# Patient Record
Sex: FEMALE | Race: WHITE | Employment: FULL TIME | URBAN - METROPOLITAN AREA
[De-identification: names, ages, dates, MRNs, and addresses within clinical notes are randomized per-mention and may not be internally consistent; named-entity substitution may affect disease eponyms.]

---

## 2017-06-09 PROCEDURE — 88175 CYTOPATH C/V AUTO FLUID REDO: CPT | Performed by: OBSTETRICS & GYNECOLOGY

## 2018-06-13 ENCOUNTER — OFFICE VISIT (OUTPATIENT)
Dept: FAMILY MEDICINE CLINIC | Facility: CLINIC | Age: 48
End: 2018-06-13

## 2018-06-13 VITALS
WEIGHT: 174 LBS | DIASTOLIC BLOOD PRESSURE: 70 MMHG | OXYGEN SATURATION: 98 % | RESPIRATION RATE: 20 BRPM | HEIGHT: 67 IN | BODY MASS INDEX: 27.31 KG/M2 | SYSTOLIC BLOOD PRESSURE: 120 MMHG | TEMPERATURE: 99 F | HEART RATE: 74 BPM

## 2018-06-13 DIAGNOSIS — Z30.41 SURVEILLANCE OF PREVIOUSLY PRESCRIBED CONTRACEPTIVE PILL: ICD-10-CM

## 2018-06-13 DIAGNOSIS — Z00.00 ANNUAL PHYSICAL EXAM: ICD-10-CM

## 2018-06-13 DIAGNOSIS — Z12.31 ENCOUNTER FOR SCREENING MAMMOGRAM FOR HIGH-RISK PATIENT: ICD-10-CM

## 2018-06-13 DIAGNOSIS — E03.9 HYPOTHYROIDISM, UNSPECIFIED TYPE: ICD-10-CM

## 2018-06-13 DIAGNOSIS — Z87.42 HISTORY OF ABNORMAL CERVICAL PAP SMEAR: ICD-10-CM

## 2018-06-13 DIAGNOSIS — Z01.419 WELL WOMAN EXAM WITH ROUTINE GYNECOLOGICAL EXAM: Primary | ICD-10-CM

## 2018-06-13 PROCEDURE — 90471 IMMUNIZATION ADMIN: CPT | Performed by: FAMILY MEDICINE

## 2018-06-13 PROCEDURE — 90715 TDAP VACCINE 7 YRS/> IM: CPT | Performed by: FAMILY MEDICINE

## 2018-06-13 PROCEDURE — 88175 CYTOPATH C/V AUTO FLUID REDO: CPT | Performed by: FAMILY MEDICINE

## 2018-06-13 PROCEDURE — 99386 PREV VISIT NEW AGE 40-64: CPT | Performed by: FAMILY MEDICINE

## 2018-06-13 PROCEDURE — 87624 HPV HI-RISK TYP POOLED RSLT: CPT | Performed by: FAMILY MEDICINE

## 2018-06-13 RX ORDER — LEVOTHYROXINE SODIUM 0.1 MG/1
100 TABLET ORAL
Qty: 90 TABLET | Refills: 0 | Status: SHIPPED | OUTPATIENT
Start: 2018-06-13

## 2018-06-13 RX ORDER — ETONOGESTREL AND ETHINYL ESTRADIOL 11.7; 2.7 MG/1; MG/1
INSERT, EXTENDED RELEASE VAGINAL
Qty: 3 EACH | Refills: 3 | Status: SHIPPED | OUTPATIENT
Start: 2018-06-13 | End: 2019-08-29 | Stop reason: ALTCHOICE

## 2018-06-13 NOTE — PROGRESS NOTES
HPI:   Berenice Brannon is a 49year old female who presents for a complete physical exam.       Wt Readings from Last 6 Encounters:  06/13/18 : 174 lb  06/09/17 : 164 lb 12.8 oz  05/02/16 : 165 lb 3.2 oz  04/06/15 : 156 lb 11.2 oz  12/03/13 : 162 lb  0 Diet: watches minimally     REVIEW OF SYSTEMS:   GENERAL: feels well otherwise  SKIN: denies any unusual skin lesions  EYES:denies blurred vision or double vision  HEENT: denies nasal congestion, sinus pain or ST  LUNGS: denies shortness of breath with exe LIPID PANEL; Future  - CBC WITH DIFFERENTIAL WITH PLATELET; Future  - VITAMIN B12; Future  - VITAMIN D, 25-HYDROXY; Future  - COMP METABOLIC PANEL (14); Future    3. History of abnormal cervical Pap smear      4.  Encounter for screening mammogram for high-

## 2018-06-22 ENCOUNTER — TELEPHONE (OUTPATIENT)
Dept: FAMILY MEDICINE CLINIC | Facility: CLINIC | Age: 48
End: 2018-06-22

## 2018-06-22 RX ORDER — LEVOTHYROXINE SODIUM 100 MCG
100 TABLET ORAL
Qty: 90 TABLET | Refills: 0 | Status: SHIPPED | OUTPATIENT
Start: 2018-06-22 | End: 2018-09-21

## 2018-06-22 NOTE — TELEPHONE ENCOUNTER
See previous note pt c/o increased bowel movements,decreased appetite,more sleepy,and c/o feeling bloated. Pt states she has had sx since starting on generic Synthroid and would like to go back on brand synthroid. Please advise. Pt has been on generic x 1 wee

## 2018-06-22 NOTE — TELEPHONE ENCOUNTER
Patient was just put on geneic form of synthroid. She wants to know if she can go back to brand name, She is having side effects with her stomach.

## 2018-07-05 ENCOUNTER — LAB ENCOUNTER (OUTPATIENT)
Dept: LAB | Age: 48
End: 2018-07-05
Attending: FAMILY MEDICINE
Payer: COMMERCIAL

## 2018-07-05 DIAGNOSIS — Z00.00 ANNUAL PHYSICAL EXAM: ICD-10-CM

## 2018-07-05 LAB
25-HYDROXYVITAMIN D (TOTAL): 21.3 NG/ML (ref 30–100)
ALBUMIN SERPL-MCNC: 3.6 G/DL (ref 3.5–4.8)
ALP LIVER SERPL-CCNC: 47 U/L (ref 39–100)
ALT SERPL-CCNC: 28 U/L (ref 14–54)
AST SERPL-CCNC: 16 U/L (ref 15–41)
BASOPHILS # BLD AUTO: 0.02 X10(3) UL (ref 0–0.1)
BASOPHILS NFR BLD AUTO: 0.5 %
BILIRUB SERPL-MCNC: 0.5 MG/DL (ref 0.1–2)
BUN BLD-MCNC: 11 MG/DL (ref 8–20)
CALCIUM BLD-MCNC: 9.2 MG/DL (ref 8.3–10.3)
CHLORIDE: 109 MMOL/L (ref 101–111)
CHOLEST SMN-MCNC: 228 MG/DL (ref ?–200)
CO2: 23 MMOL/L (ref 22–32)
CREAT BLD-MCNC: 0.8 MG/DL (ref 0.55–1.02)
EOSINOPHIL # BLD AUTO: 0.14 X10(3) UL (ref 0–0.3)
EOSINOPHIL NFR BLD AUTO: 3.6 %
ERYTHROCYTE [DISTWIDTH] IN BLOOD BY AUTOMATED COUNT: 12.4 % (ref 11.5–16)
FREE T4: 1.1 NG/DL (ref 0.9–1.8)
GLUCOSE BLD-MCNC: 86 MG/DL (ref 70–99)
HAV AB SERPL IA-ACNC: 268 PG/ML (ref 193–986)
HCT VFR BLD AUTO: 44.3 % (ref 34–50)
HDLC SERPL-MCNC: 84 MG/DL (ref 45–?)
HDLC SERPL: 2.71 {RATIO} (ref ?–4.44)
HGB BLD-MCNC: 14.2 G/DL (ref 12–16)
IMMATURE GRANULOCYTE COUNT: 0.01 X10(3) UL (ref 0–1)
IMMATURE GRANULOCYTE RATIO %: 0.3 %
LDLC SERPL CALC-MCNC: 107 MG/DL (ref ?–130)
LYMPHOCYTES # BLD AUTO: 1.46 X10(3) UL (ref 0.9–4)
LYMPHOCYTES NFR BLD AUTO: 37.2 %
M PROTEIN MFR SERPL ELPH: 7.3 G/DL (ref 6.1–8.3)
MCH RBC QN AUTO: 29.7 PG (ref 27–33.2)
MCHC RBC AUTO-ENTMCNC: 32.1 G/DL (ref 31–37)
MCV RBC AUTO: 92.7 FL (ref 81–100)
MONOCYTES # BLD AUTO: 0.34 X10(3) UL (ref 0.1–1)
MONOCYTES NFR BLD AUTO: 8.7 %
NEUTROPHIL ABS PRELIM: 1.96 X10 (3) UL (ref 1.3–6.7)
NEUTROPHILS # BLD AUTO: 1.96 X10(3) UL (ref 1.3–6.7)
NEUTROPHILS NFR BLD AUTO: 49.7 %
NONHDLC SERPL-MCNC: 144 MG/DL (ref ?–130)
PLATELET # BLD AUTO: 213 10(3)UL (ref 150–450)
POTASSIUM SERPL-SCNC: 4.2 MMOL/L (ref 3.6–5.1)
RBC # BLD AUTO: 4.78 X10(6)UL (ref 3.8–5.1)
RED CELL DISTRIBUTION WIDTH-SD: 42.5 FL (ref 35.1–46.3)
SODIUM SERPL-SCNC: 140 MMOL/L (ref 136–144)
TRIGL SERPL-MCNC: 185 MG/DL (ref ?–150)
TSI SER-ACNC: 3.82 MIU/ML (ref 0.35–5.5)
VLDLC SERPL CALC-MCNC: 37 MG/DL (ref 5–40)
WBC # BLD AUTO: 3.9 X10(3) UL (ref 4–13)

## 2018-07-05 PROCEDURE — 80061 LIPID PANEL: CPT

## 2018-07-05 PROCEDURE — 84439 ASSAY OF FREE THYROXINE: CPT

## 2018-07-05 PROCEDURE — 36415 COLL VENOUS BLD VENIPUNCTURE: CPT

## 2018-07-05 PROCEDURE — 85025 COMPLETE CBC W/AUTO DIFF WBC: CPT

## 2018-07-05 PROCEDURE — 80053 COMPREHEN METABOLIC PANEL: CPT

## 2018-07-05 PROCEDURE — 82306 VITAMIN D 25 HYDROXY: CPT

## 2018-07-05 PROCEDURE — 82607 VITAMIN B-12: CPT

## 2018-07-05 PROCEDURE — 84443 ASSAY THYROID STIM HORMONE: CPT

## 2018-07-11 ENCOUNTER — TELEPHONE (OUTPATIENT)
Dept: FAMILY MEDICINE CLINIC | Facility: CLINIC | Age: 48
End: 2018-07-11

## 2018-07-11 DIAGNOSIS — E55.9 VITAMIN D DEFICIENCY: Primary | ICD-10-CM

## 2018-07-11 DIAGNOSIS — E78.00 ELEVATED CHOLESTEROL: ICD-10-CM

## 2018-07-11 DIAGNOSIS — E56.9 VITAMIN DEFICIENCY: ICD-10-CM

## 2018-07-11 NOTE — TELEPHONE ENCOUNTER
----- Message from Pamella Henry DO sent at 7/10/2018  6:23 PM CDT -----  Your cholesterol is elevated.  Please increase exercise, decrease saturated fat in your diet   Start over the counter B complex for low B12 and vitamin D 3  5,000 IU daily   Repeat labs in 4 months

## 2018-09-21 RX ORDER — LEVOTHYROXINE SODIUM 100 MCG
TABLET ORAL
Qty: 90 TABLET | Refills: 0 | Status: SHIPPED | OUTPATIENT
Start: 2018-09-21 | End: 2019-01-23

## 2019-01-24 RX ORDER — LEVOTHYROXINE SODIUM 100 MCG
100 TABLET ORAL DAILY
Qty: 90 TABLET | Refills: 0 | Status: SHIPPED | OUTPATIENT
Start: 2019-01-24 | End: 2019-04-25

## 2019-01-24 NOTE — TELEPHONE ENCOUNTER
Rx Request  Levothyroxine Sodium (SYNTHROID) 100 MCG Oral Tab    Disp:        90            R: 0      Associated Dx: Hypothyroidism    Last Visit: 06/13/2018    Last Refilled:09/21/2018     Protocol Passed?  Yes[ x ]       No[  ]

## 2019-01-25 ENCOUNTER — APPOINTMENT (OUTPATIENT)
Dept: LAB | Age: 49
End: 2019-01-25
Attending: FAMILY MEDICINE
Payer: COMMERCIAL

## 2019-01-25 ENCOUNTER — HOSPITAL ENCOUNTER (OUTPATIENT)
Dept: MAMMOGRAPHY | Age: 49
Discharge: HOME | End: 2019-01-25
Attending: FAMILY MEDICINE
Payer: COMMERCIAL

## 2019-01-25 DIAGNOSIS — E55.9 VITAMIN D DEFICIENCY: ICD-10-CM

## 2019-01-25 DIAGNOSIS — E78.00 ELEVATED CHOLESTEROL: ICD-10-CM

## 2019-01-25 DIAGNOSIS — E56.9 VITAMIN DEFICIENCY: ICD-10-CM

## 2019-01-25 DIAGNOSIS — Z12.31 ENCOUNTER FOR SCREENING MAMMOGRAM FOR HIGH-RISK PATIENT: ICD-10-CM

## 2019-01-25 LAB
CHOLEST SMN-MCNC: 223 MG/DL (ref ?–200)
HAV AB SERPL IA-ACNC: 496 PG/ML (ref 193–986)
HDLC SERPL-MCNC: 92 MG/DL (ref 40–59)
LDLC SERPL CALC-MCNC: 102 MG/DL (ref ?–100)
NONHDLC SERPL-MCNC: 131 MG/DL (ref ?–130)
TRIGL SERPL-MCNC: 147 MG/DL (ref 30–149)
VIT D+METAB SERPL-MCNC: 34.4 NG/ML (ref 30–100)
VLDLC SERPL CALC-MCNC: 29 MG/DL (ref 0–30)

## 2019-01-25 PROCEDURE — 80061 LIPID PANEL: CPT

## 2019-01-25 PROCEDURE — 36415 COLL VENOUS BLD VENIPUNCTURE: CPT

## 2019-01-25 PROCEDURE — 82306 VITAMIN D 25 HYDROXY: CPT

## 2019-01-25 PROCEDURE — 77063 BREAST TOMOSYNTHESIS BI: CPT | Performed by: FAMILY MEDICINE

## 2019-01-25 PROCEDURE — 77067 SCR MAMMO BI INCL CAD: CPT | Performed by: FAMILY MEDICINE

## 2019-01-25 PROCEDURE — 82607 VITAMIN B-12: CPT

## 2019-01-28 ENCOUNTER — OFFICE VISIT (OUTPATIENT)
Dept: FAMILY MEDICINE CLINIC | Facility: CLINIC | Age: 49
End: 2019-01-28
Payer: COMMERCIAL

## 2019-01-28 VITALS
RESPIRATION RATE: 18 BRPM | TEMPERATURE: 98 F | DIASTOLIC BLOOD PRESSURE: 78 MMHG | HEIGHT: 67 IN | WEIGHT: 174 LBS | HEART RATE: 87 BPM | SYSTOLIC BLOOD PRESSURE: 110 MMHG | OXYGEN SATURATION: 98 % | BODY MASS INDEX: 27.31 KG/M2

## 2019-01-28 DIAGNOSIS — E56.9 VITAMIN DEFICIENCY: ICD-10-CM

## 2019-01-28 DIAGNOSIS — F39 MOOD DISORDER (HCC): ICD-10-CM

## 2019-01-28 DIAGNOSIS — E78.00 ELEVATED CHOLESTEROL: Primary | ICD-10-CM

## 2019-01-28 PROCEDURE — 99213 OFFICE O/P EST LOW 20 MIN: CPT | Performed by: FAMILY MEDICINE

## 2019-01-28 RX ORDER — MELATONIN
1000 DAILY
COMMUNITY

## 2019-04-16 ENCOUNTER — OFFICE VISIT (OUTPATIENT)
Dept: FAMILY MEDICINE CLINIC | Facility: CLINIC | Age: 49
End: 2019-04-16
Payer: COMMERCIAL

## 2019-04-16 VITALS
DIASTOLIC BLOOD PRESSURE: 80 MMHG | SYSTOLIC BLOOD PRESSURE: 116 MMHG | RESPIRATION RATE: 16 BRPM | HEART RATE: 81 BPM | TEMPERATURE: 99 F | HEIGHT: 67 IN | OXYGEN SATURATION: 98 % | BODY MASS INDEX: 27.62 KG/M2 | WEIGHT: 176 LBS

## 2019-04-16 DIAGNOSIS — J32.0 CHRONIC MAXILLARY SINUSITIS: Primary | ICD-10-CM

## 2019-04-16 PROCEDURE — 99213 OFFICE O/P EST LOW 20 MIN: CPT | Performed by: FAMILY MEDICINE

## 2019-04-16 RX ORDER — CEFDINIR 300 MG/1
300 CAPSULE ORAL 2 TIMES DAILY
Qty: 20 CAPSULE | Refills: 0 | Status: SHIPPED | OUTPATIENT
Start: 2019-04-16 | End: 2019-04-26

## 2019-04-16 RX ORDER — FLUTICASONE PROPIONATE 50 MCG
2 SPRAY, SUSPENSION (ML) NASAL NIGHTLY
Qty: 1 BOTTLE | Refills: 0 | Status: SHIPPED | OUTPATIENT
Start: 2019-04-16 | End: 2021-08-03 | Stop reason: ALTCHOICE

## 2019-04-16 NOTE — PROGRESS NOTES
Pt here with cold symptoms.     3rd bout since February   No treatment thus far   Getting worse   OTC meds theraflu   +  cough and congestion  +  sinus tenderness  No ear pain  +  throat pain  No  fever and chills  ?  sick contacts    ROS otherwise negative

## 2019-04-25 RX ORDER — LEVOTHYROXINE SODIUM 100 MCG
TABLET ORAL
Qty: 90 TABLET | Refills: 0 | Status: SHIPPED | OUTPATIENT
Start: 2019-04-25 | End: 2019-07-24

## 2019-07-24 RX ORDER — LEVOTHYROXINE SODIUM 100 MCG
TABLET ORAL
Qty: 90 TABLET | Refills: 0 | Status: SHIPPED | OUTPATIENT
Start: 2019-07-24 | End: 2019-10-25

## 2019-08-29 ENCOUNTER — OFFICE VISIT (OUTPATIENT)
Dept: FAMILY MEDICINE CLINIC | Facility: CLINIC | Age: 49
End: 2019-08-29
Payer: COMMERCIAL

## 2019-08-29 VITALS
OXYGEN SATURATION: 98 % | SYSTOLIC BLOOD PRESSURE: 116 MMHG | TEMPERATURE: 98 F | RESPIRATION RATE: 16 BRPM | HEART RATE: 84 BPM | HEIGHT: 67 IN | BODY MASS INDEX: 27.62 KG/M2 | DIASTOLIC BLOOD PRESSURE: 78 MMHG | WEIGHT: 176 LBS

## 2019-08-29 DIAGNOSIS — Z12.31 ENCOUNTER FOR SCREENING MAMMOGRAM FOR HIGH-RISK PATIENT: ICD-10-CM

## 2019-08-29 DIAGNOSIS — Z01.419 WELL WOMAN EXAM WITH ROUTINE GYNECOLOGICAL EXAM: Primary | ICD-10-CM

## 2019-08-29 DIAGNOSIS — Z12.11 COLON CANCER SCREENING: ICD-10-CM

## 2019-08-29 DIAGNOSIS — Z00.00 ANNUAL PHYSICAL EXAM: ICD-10-CM

## 2019-08-29 PROCEDURE — 88175 CYTOPATH C/V AUTO FLUID REDO: CPT | Performed by: FAMILY MEDICINE

## 2019-08-29 PROCEDURE — 99396 PREV VISIT EST AGE 40-64: CPT | Performed by: FAMILY MEDICINE

## 2019-08-29 PROCEDURE — 87624 HPV HI-RISK TYP POOLED RSLT: CPT | Performed by: FAMILY MEDICINE

## 2019-08-29 NOTE — PROGRESS NOTES
HPI:   Obed Peacock is a 47year old female who presents for a complete physical exam.       Wt Readings from Last 6 Encounters:  08/29/19 : 176 lb  04/16/19 : 176 lb  01/28/19 : 174 lb  06/13/18 : 174 lb  06/09/17 : 164 lb 12.8 oz  05/02/16 : 165 l Cholesterol Mother    • Diabetes Mother    • Heart Disease Paternal Grandfather    • Diabetes Paternal Grandmother    • Heart Disease Paternal Grandmother    • Hypertension Brother       Social History:   Social History    Tobacco Use      Smoking status: PAP was done - some bleeding started after pap done   RECTAL:good rectal tone,no mass, brown stool, stool is OB negative  MUSCULOSKELETAL: back is not tender,FROM of the back  EXTREMITIES: no cyanosis, clubbing or edema  NEURO: cranial nerves are intact,mo

## 2019-08-30 LAB — HPV I/H RISK 1 DNA SPEC QL NAA+PROBE: NEGATIVE

## 2019-09-16 ENCOUNTER — LAB ENCOUNTER (OUTPATIENT)
Dept: LAB | Age: 49
End: 2019-09-16
Attending: FAMILY MEDICINE
Payer: COMMERCIAL

## 2019-09-16 DIAGNOSIS — Z00.00 ANNUAL PHYSICAL EXAM: ICD-10-CM

## 2019-09-16 LAB
ALBUMIN SERPL-MCNC: 3.6 G/DL (ref 3.4–5)
ALBUMIN/GLOB SERPL: 1.1 {RATIO} (ref 1–2)
ALP LIVER SERPL-CCNC: 56 U/L (ref 39–100)
ALT SERPL-CCNC: 25 U/L (ref 13–56)
ANION GAP SERPL CALC-SCNC: 5 MMOL/L (ref 0–18)
AST SERPL-CCNC: 18 U/L (ref 15–37)
BASOPHILS # BLD AUTO: 0.02 X10(3) UL (ref 0–0.2)
BASOPHILS NFR BLD AUTO: 0.5 %
BILIRUB SERPL-MCNC: 0.6 MG/DL (ref 0.1–2)
BUN BLD-MCNC: 10 MG/DL (ref 7–18)
BUN/CREAT SERPL: 12.2 (ref 10–20)
CALCIUM BLD-MCNC: 8.5 MG/DL (ref 8.5–10.1)
CHLORIDE SERPL-SCNC: 109 MMOL/L (ref 98–112)
CHOLEST SMN-MCNC: 200 MG/DL (ref ?–200)
CO2 SERPL-SCNC: 26 MMOL/L (ref 21–32)
CREAT BLD-MCNC: 0.82 MG/DL (ref 0.55–1.02)
DEPRECATED RDW RBC AUTO: 39.3 FL (ref 35.1–46.3)
EOSINOPHIL # BLD AUTO: 0.17 X10(3) UL (ref 0–0.7)
EOSINOPHIL NFR BLD AUTO: 4 %
ERYTHROCYTE [DISTWIDTH] IN BLOOD BY AUTOMATED COUNT: 12 % (ref 11–15)
GLOBULIN PLAS-MCNC: 3.4 G/DL (ref 2.8–4.4)
GLUCOSE BLD-MCNC: 93 MG/DL (ref 70–99)
HCT VFR BLD AUTO: 41.9 % (ref 35–48)
HDLC SERPL-MCNC: 76 MG/DL (ref 40–59)
HGB BLD-MCNC: 14.3 G/DL (ref 12–16)
IMM GRANULOCYTES # BLD AUTO: 0.01 X10(3) UL (ref 0–1)
IMM GRANULOCYTES NFR BLD: 0.2 %
LDLC SERPL CALC-MCNC: 103 MG/DL (ref ?–100)
LYMPHOCYTES # BLD AUTO: 1.41 X10(3) UL (ref 1–4)
LYMPHOCYTES NFR BLD AUTO: 32.8 %
M PROTEIN MFR SERPL ELPH: 7 G/DL (ref 6.4–8.2)
MCH RBC QN AUTO: 30.7 PG (ref 26–34)
MCHC RBC AUTO-ENTMCNC: 34.1 G/DL (ref 31–37)
MCV RBC AUTO: 89.9 FL (ref 80–100)
MONOCYTES # BLD AUTO: 0.34 X10(3) UL (ref 0.1–1)
MONOCYTES NFR BLD AUTO: 7.9 %
NEUTROPHILS # BLD AUTO: 2.35 X10 (3) UL (ref 1.5–7.7)
NEUTROPHILS # BLD AUTO: 2.35 X10(3) UL (ref 1.5–7.7)
NEUTROPHILS NFR BLD AUTO: 54.6 %
NONHDLC SERPL-MCNC: 124 MG/DL (ref ?–130)
OSMOLALITY SERPL CALC.SUM OF ELEC: 289 MOSM/KG (ref 275–295)
PLATELET # BLD AUTO: 208 10(3)UL (ref 150–450)
POTASSIUM SERPL-SCNC: 4.5 MMOL/L (ref 3.5–5.1)
RBC # BLD AUTO: 4.66 X10(6)UL (ref 3.8–5.3)
SODIUM SERPL-SCNC: 140 MMOL/L (ref 136–145)
T4 FREE SERPL-MCNC: 1.2 NG/DL (ref 0.8–1.7)
TRIGL SERPL-MCNC: 107 MG/DL (ref 30–149)
TSI SER-ACNC: 0.92 MIU/ML (ref 0.36–3.74)
VIT B12 SERPL-MCNC: 684 PG/ML (ref 193–986)
VIT D+METAB SERPL-MCNC: 38.1 NG/ML (ref 30–100)
VLDLC SERPL CALC-MCNC: 21 MG/DL (ref 0–30)
WBC # BLD AUTO: 4.3 X10(3) UL (ref 4–11)

## 2019-09-16 PROCEDURE — 84443 ASSAY THYROID STIM HORMONE: CPT

## 2019-09-16 PROCEDURE — 84439 ASSAY OF FREE THYROXINE: CPT

## 2019-09-16 PROCEDURE — 82607 VITAMIN B-12: CPT

## 2019-09-16 PROCEDURE — 80053 COMPREHEN METABOLIC PANEL: CPT

## 2019-09-16 PROCEDURE — 82306 VITAMIN D 25 HYDROXY: CPT

## 2019-09-16 PROCEDURE — 80061 LIPID PANEL: CPT

## 2019-09-16 PROCEDURE — 85025 COMPLETE CBC W/AUTO DIFF WBC: CPT

## 2019-09-16 PROCEDURE — 36415 COLL VENOUS BLD VENIPUNCTURE: CPT

## 2019-10-25 RX ORDER — LEVOTHYROXINE SODIUM 100 MCG
TABLET ORAL
Qty: 90 TABLET | Refills: 0 | Status: SHIPPED | OUTPATIENT
Start: 2019-10-25 | End: 2020-01-27

## 2019-10-25 NOTE — TELEPHONE ENCOUNTER
Rx Request  SYNTHROID 100 MCG Oral Tab    Disp:     90               R: 0    Last Visit: 08/29/2019    Last Refilled: 07/24/2019    Protocol Passed?  Yes[ x ]       No[  ]

## 2019-11-23 ENCOUNTER — E-VISIT (OUTPATIENT)
Dept: FAMILY MEDICINE CLINIC | Facility: CLINIC | Age: 49
End: 2019-11-23

## 2019-11-23 DIAGNOSIS — R39.9 SYMPTOMS OF URINARY TRACT INFECTION: Primary | ICD-10-CM

## 2019-11-23 PROCEDURE — 98969 ONLINE SERVICE BY HC PRO: CPT | Performed by: NURSE PRACTITIONER

## 2019-11-23 RX ORDER — NITROFURANTOIN 25; 75 MG/1; MG/1
CAPSULE ORAL
Qty: 14 CAPSULE | Refills: 0 | Status: SHIPPED | OUTPATIENT
Start: 2019-11-23 | End: 2020-07-20

## 2019-11-23 NOTE — PROGRESS NOTES
Jaron Sidhu is a 47year old female. HPI:   See answers to questions above.      Current Outpatient Medications   Medication Sig Dispense Refill   • Nitrofurantoin Monohyd Macro 100 MG Oral Cap Take one capsule 2 times daily for 7 days 14 capsule 0

## 2020-01-27 ENCOUNTER — HOSPITAL ENCOUNTER (OUTPATIENT)
Dept: MAMMOGRAPHY | Age: 50
Discharge: HOME | End: 2020-01-27
Attending: FAMILY MEDICINE
Payer: COMMERCIAL

## 2020-01-27 DIAGNOSIS — Z12.31 ENCOUNTER FOR SCREENING MAMMOGRAM FOR HIGH-RISK PATIENT: ICD-10-CM

## 2020-01-27 PROCEDURE — 77063 BREAST TOMOSYNTHESIS BI: CPT | Performed by: FAMILY MEDICINE

## 2020-01-27 PROCEDURE — 77067 SCR MAMMO BI INCL CAD: CPT | Performed by: FAMILY MEDICINE

## 2020-01-27 RX ORDER — LEVOTHYROXINE SODIUM 100 MCG
TABLET ORAL
Qty: 90 TABLET | Refills: 0 | Status: SHIPPED | OUTPATIENT
Start: 2020-01-27 | End: 2020-04-22

## 2020-03-01 ENCOUNTER — E-VISIT (OUTPATIENT)
Dept: FAMILY MEDICINE CLINIC | Facility: CLINIC | Age: 50
End: 2020-03-01

## 2020-03-01 DIAGNOSIS — R30.0 DYSURIA: Primary | ICD-10-CM

## 2020-03-01 RX ORDER — NITROFURANTOIN 25; 75 MG/1; MG/1
100 CAPSULE ORAL 2 TIMES DAILY
Qty: 14 CAPSULE | Refills: 0 | Status: SHIPPED | OUTPATIENT
Start: 2020-03-01 | End: 2020-03-08

## 2020-04-22 RX ORDER — LEVOTHYROXINE SODIUM 100 MCG
100 TABLET ORAL DAILY
Qty: 90 TABLET | Refills: 0 | Status: SHIPPED | OUTPATIENT
Start: 2020-04-22 | End: 2020-07-24

## 2020-04-27 ENCOUNTER — PATIENT MESSAGE (OUTPATIENT)
Dept: FAMILY MEDICINE CLINIC | Facility: CLINIC | Age: 50
End: 2020-04-27

## 2020-04-27 NOTE — TELEPHONE ENCOUNTER
From: Suma Sharp  To: Jerome Moe DO  Sent: 4/27/2020 7:59 AM CDT  Subject: Other    Good morning Dr. Vázquez Phi you and your family are all well!! 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     Wh

## 2020-07-20 ENCOUNTER — E-VISIT (OUTPATIENT)
Dept: FAMILY MEDICINE CLINIC | Facility: CLINIC | Age: 50
End: 2020-07-20

## 2020-07-20 DIAGNOSIS — R39.9 UTI SYMPTOMS: Primary | ICD-10-CM

## 2020-07-20 PROCEDURE — 99421 OL DIG E/M SVC 5-10 MIN: CPT | Performed by: NURSE PRACTITIONER

## 2020-07-20 RX ORDER — NITROFURANTOIN 25; 75 MG/1; MG/1
100 CAPSULE ORAL 2 TIMES DAILY
Qty: 14 CAPSULE | Refills: 0 | Status: SHIPPED | OUTPATIENT
Start: 2020-07-20 | End: 2020-07-27

## 2020-07-20 NOTE — PROGRESS NOTES
Joy Ernst is a 47year old female. HPI:   See answers to questions above.      Current Outpatient Medications   Medication Sig Dispense Refill   • Nitrofurantoin Monohyd Macro 100 MG Oral Cap Take 1 capsule (100 mg total) by mouth 2 (two) times d the service:  5 minutes    Patient advised to follow up with PCP if no improvement or worsening of symptoms  Refer to MyChart message for specific patient instructions

## 2020-07-20 NOTE — PATIENT INSTRUCTIONS
Bladder Infection, Female (Adult)     Urine normally doesn't have any germs (bacteria) in it. But bacteria can get into the urinary tract from the skin around the rectum. Or they can travel in the blood from other parts of the body.  Once they are in your · Wiping incorrectly after urinating. Always wipe from front to back. · Bowel incontinence  · Pregnancy  · Procedures such as having a catheter put in  · Older age  · Not emptying your bladder. This can give bacteria a chance to grow in your urine.   · Flu · Improve your diet and prevent constipation. Eat more fresh fruits and vegetables, and fiber. Eat less junk foods and fatty foods. · Don't have sex until your symptoms are gone. · Don't have caffeine, alcohol, and spicy foods.  These can irritate your bl How should I use this medicine? Take this medicine by mouth with a glass of water. Follow the directions on the prescription label. Take this medicine with food or milk. Take your doses at regular intervals.  Do not take your medicine more often than direjoseph What should I tell my health care provider before I take this medicine?   They need to know if you have any of these conditions:  · anemia  · diabetes  · glucose-6-phosphate dehydrogenase deficiency  · kidney disease  · liver disease  · lung disease  · othe

## 2020-07-24 RX ORDER — LEVOTHYROXINE SODIUM 100 MCG
100 TABLET ORAL DAILY
Qty: 90 TABLET | Refills: 0 | Status: SHIPPED | OUTPATIENT
Start: 2020-07-24 | End: 2020-10-24

## 2020-07-24 NOTE — TELEPHONE ENCOUNTER
Last ov 8/29/2019    Last refill 4/22/2020    .   Thyroid:    Lab Results   Component Value Date    TSH 0.919 09/16/2019    TSH 3.820 07/05/2018    T4F 1.2 09/16/2019    T4F 1.1 07/05/2018

## 2020-09-07 NOTE — PROGRESS NOTES
HPI:   Berenice Brannon is a 45year old female who presents for a complete physical exam.       Wt Readings from Last 6 Encounters:  09/08/20 : 172 lb (78 kg)  08/29/19 : 176 lb (79.8 kg)  04/16/19 : 176 lb (79.8 kg)  01/28/19 : 174 lb (78.9 kg)  06/13 2009    cin1   • HYPOTHYROIDISM    • SEASONAL ALLERGIES    • VARICELLA       No past surgical history on file.    Family History   Problem Relation Age of Onset   • Hypertension Father    • Heart Disease Father    • Heart Surgery Father 79        2 stints masses, HSM or tenderness  CHEST: no chest tenderness  BREAST: no axillary LAD, no masses no nipple discharge bilaterally  : external genitalia - no inguinal LAD, no lesions. Speculum exam- introitus is normal,scant discharge,cervix is pink.  Bimanual ex

## 2020-09-08 ENCOUNTER — OFFICE VISIT (OUTPATIENT)
Dept: FAMILY MEDICINE CLINIC | Facility: CLINIC | Age: 50
End: 2020-09-08
Payer: COMMERCIAL

## 2020-09-08 VITALS
TEMPERATURE: 98 F | RESPIRATION RATE: 18 BRPM | BODY MASS INDEX: 27 KG/M2 | SYSTOLIC BLOOD PRESSURE: 122 MMHG | HEIGHT: 67 IN | DIASTOLIC BLOOD PRESSURE: 72 MMHG | OXYGEN SATURATION: 98 % | WEIGHT: 172 LBS | HEART RATE: 70 BPM

## 2020-09-08 DIAGNOSIS — Z12.31 ENCOUNTER FOR SCREENING MAMMOGRAM FOR HIGH-RISK PATIENT: ICD-10-CM

## 2020-09-08 DIAGNOSIS — Z01.419 WELL WOMAN EXAM WITH ROUTINE GYNECOLOGICAL EXAM: Primary | ICD-10-CM

## 2020-09-08 DIAGNOSIS — Z00.00 ANNUAL PHYSICAL EXAM: ICD-10-CM

## 2020-09-08 PROCEDURE — 88175 CYTOPATH C/V AUTO FLUID REDO: CPT | Performed by: FAMILY MEDICINE

## 2020-09-08 PROCEDURE — 3008F BODY MASS INDEX DOCD: CPT | Performed by: FAMILY MEDICINE

## 2020-09-08 PROCEDURE — 87624 HPV HI-RISK TYP POOLED RSLT: CPT | Performed by: FAMILY MEDICINE

## 2020-09-08 PROCEDURE — 3078F DIAST BP <80 MM HG: CPT | Performed by: FAMILY MEDICINE

## 2020-09-08 PROCEDURE — 3074F SYST BP LT 130 MM HG: CPT | Performed by: FAMILY MEDICINE

## 2020-09-08 PROCEDURE — 99396 PREV VISIT EST AGE 40-64: CPT | Performed by: FAMILY MEDICINE

## 2020-09-14 LAB — HPV I/H RISK 1 DNA SPEC QL NAA+PROBE: NEGATIVE

## 2020-09-15 ENCOUNTER — VIRTUAL PHONE E/M (OUTPATIENT)
Dept: FAMILY MEDICINE CLINIC | Facility: CLINIC | Age: 50
End: 2020-09-15
Payer: COMMERCIAL

## 2020-09-15 ENCOUNTER — OFFICE VISIT (OUTPATIENT)
Dept: FAMILY MEDICINE CLINIC | Facility: CLINIC | Age: 50
End: 2020-09-15
Payer: COMMERCIAL

## 2020-09-15 ENCOUNTER — APPOINTMENT (OUTPATIENT)
Dept: LAB | Age: 50
End: 2020-09-15
Attending: FAMILY MEDICINE
Payer: COMMERCIAL

## 2020-09-15 VITALS
HEART RATE: 77 BPM | OXYGEN SATURATION: 99 % | BODY MASS INDEX: 27 KG/M2 | TEMPERATURE: 98 F | DIASTOLIC BLOOD PRESSURE: 78 MMHG | HEIGHT: 67 IN | WEIGHT: 172 LBS | RESPIRATION RATE: 18 BRPM | SYSTOLIC BLOOD PRESSURE: 126 MMHG

## 2020-09-15 DIAGNOSIS — Z20.822 CLOSE EXPOSURE TO COVID-19 VIRUS: ICD-10-CM

## 2020-09-15 DIAGNOSIS — Z02.9 ADMINISTRATIVE ENCOUNTER: Primary | ICD-10-CM

## 2020-09-15 DIAGNOSIS — R05.9 COUGH: ICD-10-CM

## 2020-09-15 DIAGNOSIS — Z20.822 CLOSE EXPOSURE TO COVID-19 VIRUS: Primary | ICD-10-CM

## 2020-09-15 PROCEDURE — 99213 OFFICE O/P EST LOW 20 MIN: CPT | Performed by: FAMILY MEDICINE

## 2020-09-15 PROCEDURE — 3074F SYST BP LT 130 MM HG: CPT | Performed by: FAMILY MEDICINE

## 2020-09-15 PROCEDURE — 3078F DIAST BP <80 MM HG: CPT | Performed by: FAMILY MEDICINE

## 2020-09-15 PROCEDURE — 3008F BODY MASS INDEX DOCD: CPT | Performed by: FAMILY MEDICINE

## 2020-09-15 NOTE — PROGRESS NOTES
Virtual Telephone Check-In    Miguel Fernandez verbally consents to a Virtual/Telephone Check-In visit on 09/15/20. Patient has been referred to the [facility] website at [email or web address] to review the yearly Consent to Treat document.     Patient und * You provided care at home to someone who is sick with COVID-19  * You had direct physical contact with the person (touched, hugged, or kissed them)  * You shared eating or drinking utensils  * They sneezed, coughed, or somehow got respiratory droplets on If you are awaiting test results or are confirmed positive for COVID -19, and your symptoms worsen at home with symptoms such as: extreme weakness, difficult breathing, or unrelenting fevers greater than 100.4 degrees Fahrenheit, you should contact your he Faizan Torres, in conjunction with Yamilet Davis., is looking for patients who have recovered from COVID-19 and would be interested in donating plasma.     Convalescent plasma is a component of blood that, in people who have recovered from COVID-19, [email or web address].[email or web address]/  https://www.cdc.gov/coronavirus/2019-ncov/

## 2020-09-17 LAB — SARS-COV-2 RNA RESP QL NAA+PROBE: NOT DETECTED

## 2020-09-18 ENCOUNTER — APPOINTMENT (OUTPATIENT)
Dept: LAB | Age: 50
End: 2020-09-18
Attending: INTERNAL MEDICINE
Payer: COMMERCIAL

## 2020-09-18 DIAGNOSIS — Z01.818 PRE-OP TESTING: ICD-10-CM

## 2020-09-19 LAB — SARS-COV-2 RNA RESP QL NAA+PROBE: NOT DETECTED

## 2020-09-21 PROBLEM — D12.0 BENIGN NEOPLASM OF CECUM: Status: ACTIVE | Noted: 2020-09-21

## 2020-09-21 PROBLEM — D12.2 BENIGN NEOPLASM OF ASCENDING COLON: Status: ACTIVE | Noted: 2020-09-21

## 2020-09-21 PROBLEM — Z12.11 SPECIAL SCREENING FOR MALIGNANT NEOPLASM OF COLON: Status: ACTIVE | Noted: 2020-09-21

## 2020-09-24 ENCOUNTER — LAB ENCOUNTER (OUTPATIENT)
Dept: LAB | Age: 50
End: 2020-09-24
Attending: FAMILY MEDICINE
Payer: COMMERCIAL

## 2020-09-24 DIAGNOSIS — Z00.00 ANNUAL PHYSICAL EXAM: ICD-10-CM

## 2020-09-24 LAB
ALBUMIN SERPL-MCNC: 3.6 G/DL (ref 3.4–5)
ALBUMIN/GLOB SERPL: 0.8 {RATIO} (ref 1–2)
ALP LIVER SERPL-CCNC: 64 U/L
ALT SERPL-CCNC: 40 U/L
ANION GAP SERPL CALC-SCNC: 6 MMOL/L (ref 0–18)
AST SERPL-CCNC: 19 U/L (ref 15–37)
BASOPHILS # BLD AUTO: 0.02 X10(3) UL (ref 0–0.2)
BASOPHILS NFR BLD AUTO: 0.4 %
BILIRUB SERPL-MCNC: 0.4 MG/DL (ref 0.1–2)
BUN BLD-MCNC: 11 MG/DL (ref 7–18)
BUN/CREAT SERPL: 14.5 (ref 10–20)
CALCIUM BLD-MCNC: 8.9 MG/DL (ref 8.5–10.1)
CHLORIDE SERPL-SCNC: 108 MMOL/L (ref 98–112)
CHOLEST SMN-MCNC: 191 MG/DL (ref ?–200)
CO2 SERPL-SCNC: 26 MMOL/L (ref 21–32)
CREAT BLD-MCNC: 0.76 MG/DL
DEPRECATED HBV CORE AB SER IA-ACNC: 43.3 NG/ML
DEPRECATED RDW RBC AUTO: 40.6 FL (ref 35.1–46.3)
EOSINOPHIL # BLD AUTO: 0.24 X10(3) UL (ref 0–0.7)
EOSINOPHIL NFR BLD AUTO: 5.1 %
ERYTHROCYTE [DISTWIDTH] IN BLOOD BY AUTOMATED COUNT: 12.1 % (ref 11–15)
GLOBULIN PLAS-MCNC: 4.5 G/DL (ref 2.8–4.4)
GLUCOSE BLD-MCNC: 95 MG/DL (ref 70–99)
HCT VFR BLD AUTO: 41.2 %
HDLC SERPL-MCNC: 75 MG/DL (ref 40–59)
HGB BLD-MCNC: 13.6 G/DL
IMM GRANULOCYTES # BLD AUTO: 0.01 X10(3) UL (ref 0–1)
IMM GRANULOCYTES NFR BLD: 0.2 %
LDLC SERPL CALC-MCNC: 94 MG/DL (ref ?–100)
LYMPHOCYTES # BLD AUTO: 1.51 X10(3) UL (ref 1–4)
LYMPHOCYTES NFR BLD AUTO: 31.9 %
M PROTEIN MFR SERPL ELPH: 8.1 G/DL (ref 6.4–8.2)
MCH RBC QN AUTO: 30.4 PG (ref 26–34)
MCHC RBC AUTO-ENTMCNC: 33 G/DL (ref 31–37)
MCV RBC AUTO: 92.2 FL
MONOCYTES # BLD AUTO: 0.39 X10(3) UL (ref 0.1–1)
MONOCYTES NFR BLD AUTO: 8.2 %
NEUTROPHILS # BLD AUTO: 2.56 X10 (3) UL (ref 1.5–7.7)
NEUTROPHILS # BLD AUTO: 2.56 X10(3) UL (ref 1.5–7.7)
NEUTROPHILS NFR BLD AUTO: 54.2 %
NONHDLC SERPL-MCNC: 116 MG/DL (ref ?–130)
OSMOLALITY SERPL CALC.SUM OF ELEC: 289 MOSM/KG (ref 275–295)
PATIENT FASTING Y/N/NP: YES
PATIENT FASTING Y/N/NP: YES
PLATELET # BLD AUTO: 218 10(3)UL (ref 150–450)
POTASSIUM SERPL-SCNC: 4.1 MMOL/L (ref 3.5–5.1)
RBC # BLD AUTO: 4.47 X10(6)UL
SODIUM SERPL-SCNC: 140 MMOL/L (ref 136–145)
T4 FREE SERPL-MCNC: 1.4 NG/DL (ref 0.8–1.7)
TRIGL SERPL-MCNC: 108 MG/DL (ref 30–149)
TSI SER-ACNC: 1.4 MIU/ML (ref 0.36–3.74)
VIT B12 SERPL-MCNC: 854 PG/ML (ref 193–986)
VIT D+METAB SERPL-MCNC: 42.3 NG/ML (ref 30–100)
VLDLC SERPL CALC-MCNC: 22 MG/DL (ref 0–30)
WBC # BLD AUTO: 4.7 X10(3) UL (ref 4–11)

## 2020-09-24 PROCEDURE — 84439 ASSAY OF FREE THYROXINE: CPT | Performed by: FAMILY MEDICINE

## 2020-09-24 PROCEDURE — 80061 LIPID PANEL: CPT | Performed by: FAMILY MEDICINE

## 2020-09-24 PROCEDURE — 82306 VITAMIN D 25 HYDROXY: CPT | Performed by: FAMILY MEDICINE

## 2020-09-24 PROCEDURE — 80050 GENERAL HEALTH PANEL: CPT | Performed by: FAMILY MEDICINE

## 2020-09-24 PROCEDURE — 82607 VITAMIN B-12: CPT | Performed by: FAMILY MEDICINE

## 2020-09-24 PROCEDURE — 82728 ASSAY OF FERRITIN: CPT | Performed by: FAMILY MEDICINE

## 2020-09-24 PROCEDURE — 36415 COLL VENOUS BLD VENIPUNCTURE: CPT | Performed by: FAMILY MEDICINE

## 2020-10-24 RX ORDER — LEVOTHYROXINE SODIUM 100 MCG
TABLET ORAL
Qty: 90 TABLET | Refills: 0 | Status: SHIPPED | OUTPATIENT
Start: 2020-10-24 | End: 2021-01-25

## 2020-10-24 NOTE — TELEPHONE ENCOUNTER
LV 9-15-20    LR 7-24-20      Component   Ref Range & Units 9/24/20  8:08 AM   Free T4   0.8 - 1.7 ng/dL 1.4    Comment: If applicable: Pregnancy Reference Intervals   First trimester 10-13 weeks gestation    0.9-1.4 ng/dL   Second trimester 14-26 weeks ge

## 2021-01-25 RX ORDER — LEVOTHYROXINE SODIUM 100 MCG
100 TABLET ORAL DAILY
Qty: 90 TABLET | Refills: 0 | Status: SHIPPED | OUTPATIENT
Start: 2021-01-25 | End: 2021-01-28

## 2021-01-28 ENCOUNTER — HOSPITAL ENCOUNTER (OUTPATIENT)
Dept: MAMMOGRAPHY | Age: 51
Discharge: HOME | End: 2021-01-28
Attending: FAMILY MEDICINE
Payer: COMMERCIAL

## 2021-01-28 ENCOUNTER — TELEPHONE (OUTPATIENT)
Dept: FAMILY MEDICINE CLINIC | Facility: CLINIC | Age: 51
End: 2021-01-28

## 2021-01-28 DIAGNOSIS — Z12.31 ENCOUNTER FOR SCREENING MAMMOGRAM FOR HIGH-RISK PATIENT: ICD-10-CM

## 2021-01-28 PROCEDURE — 77067 SCR MAMMO BI INCL CAD: CPT | Performed by: FAMILY MEDICINE

## 2021-01-28 PROCEDURE — 77063 BREAST TOMOSYNTHESIS BI: CPT | Performed by: FAMILY MEDICINE

## 2021-01-28 RX ORDER — LEVOTHYROXINE SODIUM 100 MCG
100 TABLET ORAL DAILY
Qty: 90 TABLET | Refills: 0 | Status: SHIPPED | OUTPATIENT
Start: 2021-01-28 | End: 2021-04-28

## 2021-01-28 NOTE — TELEPHONE ENCOUNTER
[facility] stated that they had sent a request over a few days ago and haven't heard back. Patient is out of medication.       [facility] [address] [facility], [address], [phone]

## 2021-04-28 RX ORDER — LEVOTHYROXINE SODIUM 100 MCG
100 TABLET ORAL DAILY
Qty: 90 TABLET | Refills: 0 | Status: SHIPPED | OUTPATIENT
Start: 2021-04-28 | End: 2021-07-30

## 2021-05-17 ENCOUNTER — OFFICE VISIT (OUTPATIENT)
Dept: FAMILY MEDICINE CLINIC | Facility: CLINIC | Age: 51
End: 2021-05-17
Payer: COMMERCIAL

## 2021-05-17 VITALS
WEIGHT: 170 LBS | BODY MASS INDEX: 26.68 KG/M2 | HEART RATE: 78 BPM | SYSTOLIC BLOOD PRESSURE: 112 MMHG | HEIGHT: 67 IN | TEMPERATURE: 98 F | DIASTOLIC BLOOD PRESSURE: 68 MMHG | OXYGEN SATURATION: 98 % | RESPIRATION RATE: 18 BRPM

## 2021-05-17 DIAGNOSIS — L65.9 ALOPECIA: Primary | ICD-10-CM

## 2021-05-17 PROCEDURE — 3078F DIAST BP <80 MM HG: CPT | Performed by: FAMILY MEDICINE

## 2021-05-17 PROCEDURE — 3008F BODY MASS INDEX DOCD: CPT | Performed by: FAMILY MEDICINE

## 2021-05-17 PROCEDURE — 99213 OFFICE O/P EST LOW 20 MIN: CPT | Performed by: FAMILY MEDICINE

## 2021-05-17 PROCEDURE — 3074F SYST BP LT 130 MM HG: CPT | Performed by: FAMILY MEDICINE

## 2021-05-17 NOTE — PROGRESS NOTES
HPI:   Cristian Raymundo is a 45year old female who presents with hair loss    Hair loss noted over 3 weeks   No bald spots     Pt had covid in march 2021 - sick for 4 days     Last menses 5 weeks ago - lighter than normal   Last thyroid levels checked Smokeless tobacco: Never Used    Alcohol use: Yes      Alcohol/week: 0.0 standard drinks      Comment: Socially    Drug use: Never    Occ: . :  Children: 2       Exercise: minimal, three times per week.   Diet: watches minimally     REVIEW

## 2021-05-20 ENCOUNTER — LAB ENCOUNTER (OUTPATIENT)
Dept: LAB | Age: 51
End: 2021-05-20
Attending: FAMILY MEDICINE
Payer: COMMERCIAL

## 2021-05-20 DIAGNOSIS — L65.9 ALOPECIA: ICD-10-CM

## 2021-05-20 PROCEDURE — 82607 VITAMIN B-12: CPT | Performed by: FAMILY MEDICINE

## 2021-05-20 PROCEDURE — 82728 ASSAY OF FERRITIN: CPT | Performed by: FAMILY MEDICINE

## 2021-05-20 PROCEDURE — 84439 ASSAY OF FREE THYROXINE: CPT | Performed by: FAMILY MEDICINE

## 2021-05-20 PROCEDURE — 86038 ANTINUCLEAR ANTIBODIES: CPT | Performed by: FAMILY MEDICINE

## 2021-05-20 PROCEDURE — 85025 COMPLETE CBC W/AUTO DIFF WBC: CPT | Performed by: FAMILY MEDICINE

## 2021-05-20 PROCEDURE — 84443 ASSAY THYROID STIM HORMONE: CPT | Performed by: FAMILY MEDICINE

## 2021-07-26 ENCOUNTER — E-VISIT (OUTPATIENT)
Dept: TELEHEALTH | Age: 51
End: 2021-07-26

## 2021-07-26 DIAGNOSIS — R39.9 UTI SYMPTOMS: Primary | ICD-10-CM

## 2021-07-26 PROCEDURE — 99421 OL DIG E/M SVC 5-10 MIN: CPT | Performed by: NURSE PRACTITIONER

## 2021-07-26 RX ORDER — PHENAZOPYRIDINE HYDROCHLORIDE 200 MG/1
TABLET, FILM COATED ORAL
Qty: 6 TABLET | Refills: 0 | Status: SHIPPED | OUTPATIENT
Start: 2021-07-26 | End: 2021-09-09

## 2021-07-26 RX ORDER — NITROFURANTOIN 25; 75 MG/1; MG/1
CAPSULE ORAL
Qty: 14 CAPSULE | Refills: 0 | Status: SHIPPED | OUTPATIENT
Start: 2021-07-26 | End: 2021-09-09

## 2021-07-30 RX ORDER — LEVOTHYROXINE SODIUM 100 MCG
100 TABLET ORAL DAILY
Qty: 90 TABLET | Refills: 0 | Status: SHIPPED | OUTPATIENT
Start: 2021-07-30 | End: 2021-09-09

## 2021-07-30 NOTE — TELEPHONE ENCOUNTER
Future appt: 9/9/2021. Last [facility] 9/15/2020.   Last TSH: 5/20/21: 1.650  Approve/deny:

## 2021-07-30 NOTE — TELEPHONE ENCOUNTER
Patient is completely out of SYNTHROID 100 MCG Oral Tab. States that the pharmacy has tried to reach us for 10 days.     Please [facility] script to    [facility] [facility], [address], [phone], [phone]

## 2021-09-09 ENCOUNTER — OFFICE VISIT (OUTPATIENT)
Dept: FAMILY MEDICINE CLINIC | Facility: CLINIC | Age: 51
End: 2021-09-09
Payer: COMMERCIAL

## 2021-09-09 ENCOUNTER — LAB ENCOUNTER (OUTPATIENT)
Dept: LAB | Age: 51
End: 2021-09-09
Attending: FAMILY MEDICINE
Payer: COMMERCIAL

## 2021-09-09 VITALS
BODY MASS INDEX: 26.21 KG/M2 | DIASTOLIC BLOOD PRESSURE: 72 MMHG | OXYGEN SATURATION: 99 % | SYSTOLIC BLOOD PRESSURE: 110 MMHG | TEMPERATURE: 98 F | WEIGHT: 167 LBS | HEART RATE: 78 BPM | HEIGHT: 67 IN | RESPIRATION RATE: 18 BRPM

## 2021-09-09 DIAGNOSIS — Z00.00 ANNUAL PHYSICAL EXAM: Primary | ICD-10-CM

## 2021-09-09 DIAGNOSIS — Z00.00 ANNUAL PHYSICAL EXAM: ICD-10-CM

## 2021-09-09 DIAGNOSIS — N39.3 STRESS INCONTINENCE: ICD-10-CM

## 2021-09-09 DIAGNOSIS — Z12.31 ENCOUNTER FOR SCREENING MAMMOGRAM FOR HIGH-RISK PATIENT: ICD-10-CM

## 2021-09-09 DIAGNOSIS — Z01.419 WELL WOMAN EXAM WITH ROUTINE GYNECOLOGICAL EXAM: ICD-10-CM

## 2021-09-09 LAB
ALBUMIN SERPL-MCNC: 3.5 G/DL (ref 3.4–5)
ALBUMIN/GLOB SERPL: 0.9 {RATIO} (ref 1–2)
ALP LIVER SERPL-CCNC: 59 U/L
ALT SERPL-CCNC: 21 U/L
ANION GAP SERPL CALC-SCNC: 7 MMOL/L (ref 0–18)
AST SERPL-CCNC: 12 U/L (ref 15–37)
BASOPHILS # BLD AUTO: 0.03 X10(3) UL (ref 0–0.2)
BASOPHILS NFR BLD AUTO: 0.6 %
BILIRUB SERPL-MCNC: 0.6 MG/DL (ref 0.1–2)
BUN BLD-MCNC: 13 MG/DL (ref 7–18)
CALCIUM BLD-MCNC: 9 MG/DL (ref 8.5–10.1)
CHLORIDE SERPL-SCNC: 107 MMOL/L (ref 98–112)
CHOLEST SMN-MCNC: 239 MG/DL (ref ?–200)
CO2 SERPL-SCNC: 24 MMOL/L (ref 21–32)
CREAT BLD-MCNC: 0.76 MG/DL
DEPRECATED HBV CORE AB SER IA-ACNC: 27 NG/ML
EOSINOPHIL # BLD AUTO: 0.17 X10(3) UL (ref 0–0.7)
EOSINOPHIL NFR BLD AUTO: 3.1 %
ERYTHROCYTE [DISTWIDTH] IN BLOOD BY AUTOMATED COUNT: 12.7 %
GLOBULIN PLAS-MCNC: 3.9 G/DL (ref 2.8–4.4)
GLUCOSE BLD-MCNC: 91 MG/DL (ref 70–99)
HCT VFR BLD AUTO: 42.4 %
HDLC SERPL-MCNC: 86 MG/DL (ref 40–59)
HGB BLD-MCNC: 13.9 G/DL
IMM GRANULOCYTES # BLD AUTO: 0.02 X10(3) UL (ref 0–1)
IMM GRANULOCYTES NFR BLD: 0.4 %
LDLC SERPL CALC-MCNC: 132 MG/DL (ref ?–100)
LYMPHOCYTES # BLD AUTO: 1.41 X10(3) UL (ref 1–4)
LYMPHOCYTES NFR BLD AUTO: 26.1 %
M PROTEIN MFR SERPL ELPH: 7.4 G/DL (ref 6.4–8.2)
MCH RBC QN AUTO: 30.6 PG (ref 26–34)
MCHC RBC AUTO-ENTMCNC: 32.8 G/DL (ref 31–37)
MCV RBC AUTO: 93.4 FL
MONOCYTES # BLD AUTO: 0.36 X10(3) UL (ref 0.1–1)
MONOCYTES NFR BLD AUTO: 6.7 %
NEUTROPHILS # BLD AUTO: 3.41 X10 (3) UL (ref 1.5–7.7)
NEUTROPHILS # BLD AUTO: 3.41 X10(3) UL (ref 1.5–7.7)
NEUTROPHILS NFR BLD AUTO: 63.1 %
NONHDLC SERPL-MCNC: 153 MG/DL (ref ?–130)
OSMOLALITY SERPL CALC.SUM OF ELEC: 286 MOSM/KG (ref 275–295)
PATIENT FASTING Y/N/NP: YES
PATIENT FASTING Y/N/NP: YES
PLATELET # BLD AUTO: 221 10(3)UL (ref 150–450)
POTASSIUM SERPL-SCNC: 4 MMOL/L (ref 3.5–5.1)
RBC # BLD AUTO: 4.54 X10(6)UL
SODIUM SERPL-SCNC: 138 MMOL/L (ref 136–145)
T4 FREE SERPL-MCNC: 1.1 NG/DL (ref 0.8–1.7)
TRIGL SERPL-MCNC: 120 MG/DL (ref 30–149)
TSI SER-ACNC: 2.77 MIU/ML (ref 0.36–3.74)
VIT B12 SERPL-MCNC: 707 PG/ML (ref 193–986)
VIT D+METAB SERPL-MCNC: 56.5 NG/ML (ref 30–100)
VLDLC SERPL CALC-MCNC: 22 MG/DL (ref 0–30)
WBC # BLD AUTO: 5.4 X10(3) UL (ref 4–11)

## 2021-09-09 PROCEDURE — 3074F SYST BP LT 130 MM HG: CPT | Performed by: FAMILY MEDICINE

## 2021-09-09 PROCEDURE — 90750 HZV VACC RECOMBINANT IM: CPT | Performed by: FAMILY MEDICINE

## 2021-09-09 PROCEDURE — 84439 ASSAY OF FREE THYROXINE: CPT | Performed by: FAMILY MEDICINE

## 2021-09-09 PROCEDURE — 82607 VITAMIN B-12: CPT | Performed by: FAMILY MEDICINE

## 2021-09-09 PROCEDURE — 99396 PREV VISIT EST AGE 40-64: CPT | Performed by: FAMILY MEDICINE

## 2021-09-09 PROCEDURE — 80061 LIPID PANEL: CPT | Performed by: FAMILY MEDICINE

## 2021-09-09 PROCEDURE — 82306 VITAMIN D 25 HYDROXY: CPT | Performed by: FAMILY MEDICINE

## 2021-09-09 PROCEDURE — 3078F DIAST BP <80 MM HG: CPT | Performed by: FAMILY MEDICINE

## 2021-09-09 PROCEDURE — 87624 HPV HI-RISK TYP POOLED RSLT: CPT | Performed by: FAMILY MEDICINE

## 2021-09-09 PROCEDURE — 88175 CYTOPATH C/V AUTO FLUID REDO: CPT | Performed by: FAMILY MEDICINE

## 2021-09-09 PROCEDURE — 90471 IMMUNIZATION ADMIN: CPT | Performed by: FAMILY MEDICINE

## 2021-09-09 PROCEDURE — 80050 GENERAL HEALTH PANEL: CPT | Performed by: FAMILY MEDICINE

## 2021-09-09 PROCEDURE — 3008F BODY MASS INDEX DOCD: CPT | Performed by: FAMILY MEDICINE

## 2021-09-09 PROCEDURE — 82728 ASSAY OF FERRITIN: CPT | Performed by: FAMILY MEDICINE

## 2021-09-09 RX ORDER — LEVOTHYROXINE SODIUM 100 MCG
100 TABLET ORAL DAILY
Qty: 90 TABLET | Refills: 0 | Status: SHIPPED | OUTPATIENT
Start: 2021-09-09 | End: 2021-10-21

## 2021-09-09 NOTE — PROGRESS NOTES
HPI:   Trey Bacon is a 45year old female who presents for a complete physical exam.       Wt Readings from Last 6 Encounters:  09/09/21 : 167 lb (75.8 kg)  05/17/21 : 170 lb (77.1 kg)  09/15/20 : 172 lb (78 kg)  09/08/20 : 172 lb (78 kg)  09/11/2 [place]   • CERVICAL DYSPLASIA 2006, 2007, 2008, 2009    cin1   • Decorative tattoo 1994   • Heartburn     Occassional from spicy food   • Heavy menses 2020   • Hemorrhoids 2002    Post child birth   • HYPOTHYROIDISM    • SEASONAL ALLERGIES    • Sleep distress  CARDIO: RRR without murmur  LUNGS: clear to auscultation  NECK: supple,no adenopathy,no thyromegaly  HEENT: atraumatic, normocephalic,ears and throat are clear  EYES:PERRLA, EOMI, normal,conjunctiva are clear  SKIN: norashes,no suspicious lesions

## 2021-09-10 LAB — HPV I/H RISK 1 DNA SPEC QL NAA+PROBE: NEGATIVE

## 2021-10-21 RX ORDER — LEVOTHYROXINE SODIUM 100 MCG
100 TABLET ORAL DAILY
Qty: 90 TABLET | Refills: 0 | Status: SHIPPED | OUTPATIENT
Start: 2021-10-21

## 2022-02-03 ENCOUNTER — HOSPITAL ENCOUNTER (OUTPATIENT)
Dept: MAMMOGRAPHY | Age: 52
Discharge: HOME | End: 2022-02-03
Attending: FAMILY MEDICINE
Payer: COMMERCIAL

## 2022-02-03 DIAGNOSIS — Z12.31 ENCOUNTER FOR SCREENING MAMMOGRAM FOR HIGH-RISK PATIENT: ICD-10-CM

## 2022-02-03 PROCEDURE — 77067 SCR MAMMO BI INCL CAD: CPT | Performed by: FAMILY MEDICINE

## 2022-02-03 PROCEDURE — 77063 BREAST TOMOSYNTHESIS BI: CPT | Performed by: FAMILY MEDICINE

## 2022-02-09 NOTE — PROGRESS NOTES
HPI:   Lisa Ivan is a 51year old female who presents for lab follow up and stress    Pt reports under stress for 5 months ; pt reports her father in law moved in ; tough transition   Pt reports trouble sleeping due to her husbands's snoring   Pt Received request via: Patient    Was the patient seen in the last year in this department? Yes    Does the patient have an active prescription (recently filled or refills available) for medication(s) requested? YEs     0.0 oz      Comment: occas    Drug use: No    Occ: . :  Children: 2       Exercise: minimal, three times per week.   Diet: watches minimally     REVIEW OF SYSTEMS:   GENERAL: feels well otherwise  SKIN: denies any unusual skin lesions  EYES

## 2022-02-16 ENCOUNTER — HOSPITAL ENCOUNTER (OUTPATIENT)
Dept: MAMMOGRAPHY | Facility: HOSPITAL | Age: 52
Discharge: HOME | End: 2022-02-16
Attending: FAMILY MEDICINE
Payer: COMMERCIAL

## 2022-02-16 DIAGNOSIS — R92.2 INCONCLUSIVE MAMMOGRAM: ICD-10-CM

## 2022-02-16 PROCEDURE — 77061 BREAST TOMOSYNTHESIS UNI: CPT | Performed by: FAMILY MEDICINE

## 2022-02-16 PROCEDURE — 77065 DX MAMMO INCL CAD UNI: CPT | Performed by: FAMILY MEDICINE

## 2022-02-16 PROCEDURE — 76642 ULTRASOUND BREAST LIMITED: CPT | Performed by: FAMILY MEDICINE

## 2022-02-22 ENCOUNTER — LAB ENCOUNTER (OUTPATIENT)
Dept: LAB | Age: 52
End: 2022-02-22
Attending: FAMILY MEDICINE
Payer: COMMERCIAL

## 2022-02-22 DIAGNOSIS — E78.00 ELEVATED LDL CHOLESTEROL LEVEL: ICD-10-CM

## 2022-02-22 LAB
CHOLEST SERPL-MCNC: 212 MG/DL (ref ?–200)
FASTING PATIENT LIPID ANSWER: YES
HDLC SERPL-MCNC: 87 MG/DL (ref 40–59)
LDLC SERPL CALC-MCNC: 103 MG/DL (ref ?–100)
NONHDLC SERPL-MCNC: 125 MG/DL (ref ?–130)
TRIGL SERPL-MCNC: 127 MG/DL (ref 30–149)
VLDLC SERPL CALC-MCNC: 21 MG/DL (ref 0–30)

## 2022-02-22 PROCEDURE — 80061 LIPID PANEL: CPT | Performed by: FAMILY MEDICINE

## 2022-02-23 ENCOUNTER — TELEPHONE (OUTPATIENT)
Dept: FAMILY MEDICINE CLINIC | Facility: CLINIC | Age: 52
End: 2022-02-23

## 2022-02-28 ENCOUNTER — NURSE ONLY (OUTPATIENT)
Dept: FAMILY MEDICINE CLINIC | Facility: CLINIC | Age: 52
End: 2022-02-28
Payer: COMMERCIAL

## 2022-02-28 PROCEDURE — 90471 IMMUNIZATION ADMIN: CPT | Performed by: FAMILY MEDICINE

## 2022-02-28 PROCEDURE — 90750 HZV VACC RECOMBINANT IM: CPT | Performed by: FAMILY MEDICINE

## 2022-04-20 RX ORDER — LEVOTHYROXINE SODIUM 100 MCG
TABLET ORAL
Qty: 90 TABLET | Refills: 0 | Status: SHIPPED | OUTPATIENT
Start: 2022-04-20

## 2022-07-19 RX ORDER — LEVOTHYROXINE SODIUM 100 MCG
TABLET ORAL
Qty: 90 TABLET | Refills: 0 | Status: SHIPPED | OUTPATIENT
Start: 2022-07-19

## 2022-08-29 ENCOUNTER — E-VISIT (OUTPATIENT)
Dept: TELEHEALTH | Age: 52
End: 2022-08-29

## 2022-08-29 DIAGNOSIS — R39.89 SUSPECTED UTI: Primary | ICD-10-CM

## 2022-08-29 DIAGNOSIS — R30.0 DYSURIA: ICD-10-CM

## 2022-08-29 PROCEDURE — 99421 OL DIG E/M SVC 5-10 MIN: CPT | Performed by: NURSE PRACTITIONER

## 2022-08-29 RX ORDER — PHENAZOPYRIDINE HYDROCHLORIDE 200 MG/1
200 TABLET, FILM COATED ORAL 3 TIMES DAILY PRN
Qty: 6 TABLET | Refills: 0 | Status: SHIPPED | OUTPATIENT
Start: 2022-08-29 | End: 2022-08-31

## 2022-08-29 RX ORDER — NITROFURANTOIN 25; 75 MG/1; MG/1
100 CAPSULE ORAL 2 TIMES DAILY
Qty: 14 CAPSULE | Refills: 0 | Status: SHIPPED | OUTPATIENT
Start: 2022-08-29 | End: 2022-09-05

## 2022-09-21 ENCOUNTER — HOSPITAL ENCOUNTER (OUTPATIENT)
Age: 52
Discharge: HOME | End: 2022-09-21

## 2022-09-21 VITALS
HEART RATE: 82 BPM | RESPIRATION RATE: 18 BRPM | OXYGEN SATURATION: 99 % | SYSTOLIC BLOOD PRESSURE: 138 MMHG | BODY MASS INDEX: 26 KG/M2 | DIASTOLIC BLOOD PRESSURE: 87 MMHG | TEMPERATURE: 98 F | WEIGHT: 167.13 LBS

## 2022-09-21 DIAGNOSIS — N89.8 VAGINAL DISCHARGE: Primary | ICD-10-CM

## 2022-09-21 DIAGNOSIS — R09.81 HEAD CONGESTION: ICD-10-CM

## 2022-09-21 LAB
B-HCG UR QL: NEGATIVE
POCT BILIRUBIN URINE: NEGATIVE
POCT BLOOD URINE: NEGATIVE
POCT GLUCOSE URINE: NEGATIVE MG/DL
POCT KETONE URINE: NEGATIVE MG/DL
POCT LEUKOCYTE ESTERASE URINE: NEGATIVE
POCT NITRITE URINE: NEGATIVE
POCT PH URINE: 7 (ref 5–8)
POCT PROTEIN URINE: NEGATIVE MG/DL
POCT SPECIFIC GRAVITY URINE: 1.01
POCT URINE CLARITY: CLEAR
POCT URINE COLOR: YELLOW
POCT UROBILINOGEN URINE: 0.2 MG/DL
SARS-COV-2 RNA RESP QL NAA+PROBE: NOT DETECTED

## 2022-09-21 PROCEDURE — 87660 TRICHOMONAS VAGIN DIR PROBE: CPT | Performed by: NURSE PRACTITIONER

## 2022-09-21 PROCEDURE — 81002 URINALYSIS NONAUTO W/O SCOPE: CPT | Performed by: NURSE PRACTITIONER

## 2022-09-21 PROCEDURE — 87480 CANDIDA DNA DIR PROBE: CPT | Performed by: NURSE PRACTITIONER

## 2022-09-21 PROCEDURE — 99213 OFFICE O/P EST LOW 20 MIN: CPT | Performed by: NURSE PRACTITIONER

## 2022-09-21 PROCEDURE — 81025 URINE PREGNANCY TEST: CPT | Performed by: NURSE PRACTITIONER

## 2022-09-21 PROCEDURE — 87510 GARDNER VAG DNA DIR PROBE: CPT | Performed by: NURSE PRACTITIONER

## 2022-09-21 PROCEDURE — 87491 CHLMYD TRACH DNA AMP PROBE: CPT | Performed by: NURSE PRACTITIONER

## 2022-09-21 PROCEDURE — U0002 COVID-19 LAB TEST NON-CDC: HCPCS | Performed by: NURSE PRACTITIONER

## 2022-09-21 PROCEDURE — 87591 N.GONORRHOEAE DNA AMP PROB: CPT | Performed by: NURSE PRACTITIONER

## 2022-09-21 RX ORDER — FLUTICASONE PROPIONATE 50 MCG
2 SPRAY, SUSPENSION (ML) NASAL DAILY
Qty: 16 G | Refills: 0 | Status: SHIPPED | OUTPATIENT
Start: 2022-09-21 | End: 2022-10-21

## 2022-09-21 RX ORDER — METRONIDAZOLE 500 MG/1
500 TABLET ORAL 2 TIMES DAILY
Qty: 14 TABLET | Refills: 0 | Status: SHIPPED | OUTPATIENT
Start: 2022-09-21 | End: 2022-09-28

## 2022-09-21 NOTE — ED INITIAL ASSESSMENT (HPI)
Sore throat, PND & congestion x one week. Denies fever. Just feels drained, denies CP or dyspnea. UTI approximately one month ago. Denies UTI s/s. Bilat flank pain w dark vaginal discharge & odor x 2 days. Denies STI hx.  Only one partner - .

## 2022-09-22 LAB
C TRACH DNA SPEC QL NAA+PROBE: NEGATIVE
N GONORRHOEA DNA SPEC QL NAA+PROBE: NEGATIVE

## 2022-09-29 ENCOUNTER — OFFICE VISIT (OUTPATIENT)
Dept: FAMILY MEDICINE CLINIC | Facility: CLINIC | Age: 52
End: 2022-09-29

## 2022-09-29 VITALS
BODY MASS INDEX: 26.21 KG/M2 | HEIGHT: 67 IN | WEIGHT: 167 LBS | RESPIRATION RATE: 18 BRPM | SYSTOLIC BLOOD PRESSURE: 110 MMHG | DIASTOLIC BLOOD PRESSURE: 80 MMHG | OXYGEN SATURATION: 99 % | HEART RATE: 67 BPM

## 2022-09-29 DIAGNOSIS — Z12.31 ENCOUNTER FOR SCREENING MAMMOGRAM FOR HIGH-RISK PATIENT: ICD-10-CM

## 2022-09-29 DIAGNOSIS — H93.13 TINNITUS OF BOTH EARS: ICD-10-CM

## 2022-09-29 DIAGNOSIS — Z01.419 WELL WOMAN EXAM WITH ROUTINE GYNECOLOGICAL EXAM: Primary | ICD-10-CM

## 2022-09-29 DIAGNOSIS — Z00.00 ANNUAL PHYSICAL EXAM: ICD-10-CM

## 2022-09-29 PROCEDURE — 3074F SYST BP LT 130 MM HG: CPT | Performed by: FAMILY MEDICINE

## 2022-09-29 PROCEDURE — 3079F DIAST BP 80-89 MM HG: CPT | Performed by: FAMILY MEDICINE

## 2022-09-29 PROCEDURE — 99396 PREV VISIT EST AGE 40-64: CPT | Performed by: FAMILY MEDICINE

## 2022-09-29 PROCEDURE — 3008F BODY MASS INDEX DOCD: CPT | Performed by: FAMILY MEDICINE

## 2022-09-29 PROCEDURE — 87624 HPV HI-RISK TYP POOLED RSLT: CPT | Performed by: FAMILY MEDICINE

## 2022-09-29 RX ORDER — LEVOTHYROXINE SODIUM 100 MCG
100 TABLET ORAL DAILY
Qty: 90 TABLET | Refills: 0 | Status: SHIPPED | OUTPATIENT
Start: 2022-09-29

## 2022-09-30 LAB — HPV I/H RISK 1 DNA SPEC QL NAA+PROBE: NEGATIVE

## 2022-11-16 ENCOUNTER — LAB ENCOUNTER (OUTPATIENT)
Dept: LAB | Age: 52
End: 2022-11-16
Attending: FAMILY MEDICINE
Payer: COMMERCIAL

## 2022-11-16 DIAGNOSIS — Z00.00 ANNUAL PHYSICAL EXAM: ICD-10-CM

## 2022-11-16 LAB
ALBUMIN SERPL-MCNC: 3.8 G/DL (ref 3.4–5)
ALBUMIN/GLOB SERPL: 1.2 {RATIO} (ref 1–2)
ALP LIVER SERPL-CCNC: 70 U/L
ALT SERPL-CCNC: 32 U/L
ANION GAP SERPL CALC-SCNC: 8 MMOL/L (ref 0–18)
AST SERPL-CCNC: 16 U/L (ref 15–37)
BASOPHILS # BLD AUTO: 0.03 X10(3) UL (ref 0–0.2)
BASOPHILS NFR BLD AUTO: 0.7 %
BILIRUB SERPL-MCNC: 0.5 MG/DL (ref 0.1–2)
BUN BLD-MCNC: 11 MG/DL (ref 7–18)
BUN/CREAT SERPL: 13.4 (ref 10–20)
CALCIUM BLD-MCNC: 9.1 MG/DL (ref 8.5–10.1)
CHLORIDE SERPL-SCNC: 108 MMOL/L (ref 98–112)
CHOLEST SERPL-MCNC: 211 MG/DL (ref ?–200)
CO2 SERPL-SCNC: 25 MMOL/L (ref 21–32)
CREAT BLD-MCNC: 0.82 MG/DL
DEPRECATED RDW RBC AUTO: 39.5 FL (ref 35.1–46.3)
EOSINOPHIL # BLD AUTO: 0.2 X10(3) UL (ref 0–0.7)
EOSINOPHIL NFR BLD AUTO: 4.5 %
ERYTHROCYTE [DISTWIDTH] IN BLOOD BY AUTOMATED COUNT: 11.9 % (ref 11–15)
FASTING PATIENT LIPID ANSWER: YES
FASTING STATUS PATIENT QL REPORTED: YES
GFR SERPLBLD BASED ON 1.73 SQ M-ARVRAT: 85 ML/MIN/1.73M2 (ref 60–?)
GLOBULIN PLAS-MCNC: 3.2 G/DL (ref 2.8–4.4)
GLUCOSE BLD-MCNC: 101 MG/DL (ref 70–99)
HCT VFR BLD AUTO: 41.8 %
HDLC SERPL-MCNC: 87 MG/DL (ref 40–59)
HGB BLD-MCNC: 14.3 G/DL
IMM GRANULOCYTES # BLD AUTO: 0.01 X10(3) UL (ref 0–1)
IMM GRANULOCYTES NFR BLD: 0.2 %
LDLC SERPL CALC-MCNC: 100 MG/DL (ref ?–100)
LYMPHOCYTES # BLD AUTO: 1.48 X10(3) UL (ref 1–4)
LYMPHOCYTES NFR BLD AUTO: 33.3 %
MCH RBC QN AUTO: 31.2 PG (ref 26–34)
MCHC RBC AUTO-ENTMCNC: 34.2 G/DL (ref 31–37)
MCV RBC AUTO: 91.3 FL
MONOCYTES # BLD AUTO: 0.33 X10(3) UL (ref 0.1–1)
MONOCYTES NFR BLD AUTO: 7.4 %
NEUTROPHILS # BLD AUTO: 2.4 X10 (3) UL (ref 1.5–7.7)
NEUTROPHILS # BLD AUTO: 2.4 X10(3) UL (ref 1.5–7.7)
NEUTROPHILS NFR BLD AUTO: 53.9 %
NONHDLC SERPL-MCNC: 124 MG/DL (ref ?–130)
OSMOLALITY SERPL CALC.SUM OF ELEC: 292 MOSM/KG (ref 275–295)
PLATELET # BLD AUTO: 255 10(3)UL (ref 150–450)
POTASSIUM SERPL-SCNC: 4.1 MMOL/L (ref 3.5–5.1)
PROT SERPL-MCNC: 7 G/DL (ref 6.4–8.2)
RBC # BLD AUTO: 4.58 X10(6)UL
SODIUM SERPL-SCNC: 141 MMOL/L (ref 136–145)
T4 FREE SERPL-MCNC: 1.2 NG/DL (ref 0.8–1.7)
TRIGL SERPL-MCNC: 141 MG/DL (ref 30–149)
TSI SER-ACNC: 3.61 MIU/ML (ref 0.36–3.74)
VIT B12 SERPL-MCNC: 739 PG/ML (ref 193–986)
VIT D+METAB SERPL-MCNC: 48.5 NG/ML (ref 30–100)
VLDLC SERPL CALC-MCNC: 23 MG/DL (ref 0–30)
WBC # BLD AUTO: 4.5 X10(3) UL (ref 4–11)

## 2022-11-16 PROCEDURE — 84439 ASSAY OF FREE THYROXINE: CPT | Performed by: FAMILY MEDICINE

## 2022-11-16 PROCEDURE — 80050 GENERAL HEALTH PANEL: CPT | Performed by: FAMILY MEDICINE

## 2022-11-16 PROCEDURE — 80061 LIPID PANEL: CPT | Performed by: FAMILY MEDICINE

## 2022-11-16 PROCEDURE — 82306 VITAMIN D 25 HYDROXY: CPT | Performed by: FAMILY MEDICINE

## 2022-11-16 PROCEDURE — 82607 VITAMIN B-12: CPT | Performed by: FAMILY MEDICINE

## 2023-01-24 RX ORDER — LEVOTHYROXINE SODIUM 100 MCG
100 TABLET ORAL DAILY
Qty: 90 TABLET | Refills: 0 | Status: SHIPPED | OUTPATIENT
Start: 2023-01-24

## 2023-03-17 ENCOUNTER — HOSPITAL ENCOUNTER (OUTPATIENT)
Dept: MAMMOGRAPHY | Age: 53
Discharge: HOME | End: 2023-03-17
Attending: FAMILY MEDICINE
Payer: COMMERCIAL

## 2023-03-17 DIAGNOSIS — Z12.31 ENCOUNTER FOR SCREENING MAMMOGRAM FOR HIGH-RISK PATIENT: ICD-10-CM

## 2023-03-17 PROCEDURE — 77067 SCR MAMMO BI INCL CAD: CPT | Performed by: FAMILY MEDICINE

## 2023-03-17 PROCEDURE — 77063 BREAST TOMOSYNTHESIS BI: CPT | Performed by: FAMILY MEDICINE

## 2023-04-19 RX ORDER — LEVOTHYROXINE SODIUM 100 MCG
100 TABLET ORAL DAILY
Qty: 90 TABLET | Refills: 0 | Status: SHIPPED | OUTPATIENT
Start: 2023-04-19

## 2023-07-25 RX ORDER — LEVOTHYROXINE SODIUM 100 MCG
100 TABLET ORAL DAILY
Qty: 90 TABLET | Refills: 0 | Status: SHIPPED | OUTPATIENT
Start: 2023-07-25

## 2023-10-13 RX ORDER — LEVOTHYROXINE SODIUM 100 MCG
100 TABLET ORAL DAILY
Qty: 90 TABLET | Refills: 0 | OUTPATIENT
Start: 2023-10-13

## 2023-10-19 RX ORDER — LEVOTHYROXINE SODIUM 100 MCG
100 TABLET ORAL DAILY
Qty: 90 TABLET | Refills: 0 | Status: SHIPPED | OUTPATIENT
Start: 2023-10-19

## 2023-10-19 NOTE — TELEPHONE ENCOUNTER
A refill request was received for:  Requested Prescriptions     Pending Prescriptions Disp Refills    SYNTHROID 100 MCG Oral Tab 90 tablet 0     Sig: Take 1 tablet (100 mcg total) by mouth daily.      Component  Ref Range & Units 11/16/22  8:04 AM   TSH  0.358 - 3.740 mIU/mL 3.610         Component  Ref Range & Units 11/16/22  8:04 AM   Free T4  0.8 - 1.7 ng/dL 1.2     Last refill date:7/25/2023       Last office visit:9/29/2022     Follow up due:  Future Appointments   Date Time Provider Alan Ascencio   11/6/2023  8:30 AM Shaye Meraz PA-C [facility] [address]   11/15/2023  8:30 AM Damon Bustos MD [address] [address]

## 2023-11-06 ENCOUNTER — TELEPHONE (OUTPATIENT)
Dept: FAMILY MEDICINE CLINIC | Facility: CLINIC | Age: 53
End: 2023-11-06

## 2023-11-06 DIAGNOSIS — E03.9 HYPOTHYROIDISM, UNSPECIFIED TYPE: Primary | ICD-10-CM

## 2023-11-06 DIAGNOSIS — Z00.00 ROUTINE GENERAL MEDICAL EXAMINATION AT A HEALTH CARE FACILITY: ICD-10-CM

## 2023-11-06 NOTE — TELEPHONE ENCOUNTER
Had a px scheduled with LR today but that got moved to next week. She is currently fasting right now and wants to know if labs can be put in for her?

## 2023-11-08 ENCOUNTER — LAB ENCOUNTER (OUTPATIENT)
Dept: LAB | Age: 53
End: 2023-11-08
Attending: PHYSICIAN ASSISTANT
Payer: COMMERCIAL

## 2023-11-08 DIAGNOSIS — Z00.00 ROUTINE GENERAL MEDICAL EXAMINATION AT A HEALTH CARE FACILITY: ICD-10-CM

## 2023-11-08 DIAGNOSIS — E03.9 HYPOTHYROIDISM, UNSPECIFIED TYPE: ICD-10-CM

## 2023-11-08 LAB
ALBUMIN SERPL-MCNC: 3.6 G/DL (ref 3.4–5)
ALBUMIN/GLOB SERPL: 1 {RATIO} (ref 1–2)
ALP LIVER SERPL-CCNC: 61 U/L
ALT SERPL-CCNC: 30 U/L
ANION GAP SERPL CALC-SCNC: 4 MMOL/L (ref 0–18)
AST SERPL-CCNC: 20 U/L (ref 15–37)
BASOPHILS # BLD AUTO: 0.03 X10(3) UL (ref 0–0.2)
BASOPHILS NFR BLD AUTO: 0.8 %
BILIRUB SERPL-MCNC: 0.6 MG/DL (ref 0.1–2)
BUN BLD-MCNC: 11 MG/DL (ref 9–23)
CALCIUM BLD-MCNC: 8.7 MG/DL (ref 8.5–10.1)
CHLORIDE SERPL-SCNC: 110 MMOL/L (ref 98–112)
CHOLEST SERPL-MCNC: 223 MG/DL (ref ?–200)
CO2 SERPL-SCNC: 23 MMOL/L (ref 21–32)
CREAT BLD-MCNC: 0.72 MG/DL
EGFRCR SERPLBLD CKD-EPI 2021: 99 ML/MIN/1.73M2 (ref 60–?)
EOSINOPHIL # BLD AUTO: 0.18 X10(3) UL (ref 0–0.7)
EOSINOPHIL NFR BLD AUTO: 4.9 %
ERYTHROCYTE [DISTWIDTH] IN BLOOD BY AUTOMATED COUNT: 12.1 %
FASTING PATIENT LIPID ANSWER: YES
FASTING STATUS PATIENT QL REPORTED: YES
GLOBULIN PLAS-MCNC: 3.5 G/DL (ref 2.8–4.4)
GLUCOSE BLD-MCNC: 94 MG/DL (ref 70–99)
HCT VFR BLD AUTO: 42.4 %
HDLC SERPL-MCNC: 81 MG/DL (ref 40–59)
HGB BLD-MCNC: 14.6 G/DL
IMM GRANULOCYTES # BLD AUTO: 0.01 X10(3) UL (ref 0–1)
IMM GRANULOCYTES NFR BLD: 0.3 %
LDLC SERPL CALC-MCNC: 120 MG/DL (ref ?–100)
LYMPHOCYTES # BLD AUTO: 1.39 X10(3) UL (ref 1–4)
LYMPHOCYTES NFR BLD AUTO: 37.9 %
MCH RBC QN AUTO: 31 PG (ref 26–34)
MCHC RBC AUTO-ENTMCNC: 34.4 G/DL (ref 31–37)
MCV RBC AUTO: 90 FL
MONOCYTES # BLD AUTO: 0.29 X10(3) UL (ref 0.1–1)
MONOCYTES NFR BLD AUTO: 7.9 %
NEUTROPHILS # BLD AUTO: 1.77 X10 (3) UL (ref 1.5–7.7)
NEUTROPHILS # BLD AUTO: 1.77 X10(3) UL (ref 1.5–7.7)
NEUTROPHILS NFR BLD AUTO: 48.2 %
NONHDLC SERPL-MCNC: 142 MG/DL (ref ?–130)
OSMOLALITY SERPL CALC.SUM OF ELEC: 283 MOSM/KG (ref 275–295)
PLATELET # BLD AUTO: 220 10(3)UL (ref 150–450)
POTASSIUM SERPL-SCNC: 4.1 MMOL/L (ref 3.5–5.1)
PROT SERPL-MCNC: 7.1 G/DL (ref 6.4–8.2)
RBC # BLD AUTO: 4.71 X10(6)UL
SODIUM SERPL-SCNC: 137 MMOL/L (ref 136–145)
T4 FREE SERPL-MCNC: 1.1 NG/DL (ref 0.8–1.7)
TRIGL SERPL-MCNC: 127 MG/DL (ref 30–149)
TSI SER-ACNC: 4.06 MIU/ML (ref 0.36–3.74)
VIT B12 SERPL-MCNC: 873 PG/ML (ref 193–986)
VIT D+METAB SERPL-MCNC: 48.4 NG/ML (ref 30–100)
VLDLC SERPL CALC-MCNC: 22 MG/DL (ref 0–30)
WBC # BLD AUTO: 3.7 X10(3) UL (ref 4–11)

## 2023-11-08 PROCEDURE — 84439 ASSAY OF FREE THYROXINE: CPT

## 2023-11-08 PROCEDURE — 85025 COMPLETE CBC W/AUTO DIFF WBC: CPT

## 2023-11-08 PROCEDURE — 80053 COMPREHEN METABOLIC PANEL: CPT

## 2023-11-08 PROCEDURE — 84443 ASSAY THYROID STIM HORMONE: CPT

## 2023-11-08 PROCEDURE — 82607 VITAMIN B-12: CPT

## 2023-11-08 PROCEDURE — 80061 LIPID PANEL: CPT

## 2023-11-08 PROCEDURE — 82306 VITAMIN D 25 HYDROXY: CPT

## 2023-11-15 PROBLEM — Z86.010 HISTORY OF ADENOMATOUS POLYP OF COLON: Status: ACTIVE | Noted: 2023-11-15

## 2023-11-15 PROBLEM — Z86.0101 HISTORY OF ADENOMATOUS POLYP OF COLON: Status: ACTIVE | Noted: 2023-11-15

## 2023-11-16 ENCOUNTER — OFFICE VISIT (OUTPATIENT)
Dept: FAMILY MEDICINE CLINIC | Facility: CLINIC | Age: 53
End: 2023-11-16
Payer: COMMERCIAL

## 2023-11-16 VITALS
BODY MASS INDEX: 27.78 KG/M2 | RESPIRATION RATE: 18 BRPM | DIASTOLIC BLOOD PRESSURE: 80 MMHG | HEIGHT: 67 IN | OXYGEN SATURATION: 98 % | WEIGHT: 177 LBS | SYSTOLIC BLOOD PRESSURE: 128 MMHG | HEART RATE: 80 BPM

## 2023-11-16 DIAGNOSIS — Z00.00 ROUTINE GENERAL MEDICAL EXAMINATION AT A HEALTH CARE FACILITY: Primary | ICD-10-CM

## 2023-11-16 DIAGNOSIS — N39.3 STRESS INCONTINENCE: ICD-10-CM

## 2023-11-16 DIAGNOSIS — Z12.31 VISIT FOR SCREENING MAMMOGRAM: ICD-10-CM

## 2023-11-16 DIAGNOSIS — E03.9 HYPOTHYROIDISM, UNSPECIFIED TYPE: ICD-10-CM

## 2023-11-16 NOTE — PROGRESS NOTES
Subjective:   Patient ID: Venita Cabezas is a 46year old female. HPI  Patient presents today requesting physical exam.      Diet: could be better, has been traveling a lot recently  Exercise: regular exercise  Tobacco use: denies  Drug use: denies  Alcohol use: socially  LMP: perimenopause  Marital status: , 4 children  Occupation:     Health maintenance:  Pap/Hpv: 9/29/22 negative  Mammogram: 3/17/23 benign, dense breast  Performs SBEs: yes  Dexa scan: never  Colonoscopy: 11/15/23, recall 5 years  Discussed age appropriate vaccines    Labs recently checked and reviewed in office  TSH elevated, Synthroid adjusted      History/Other:   Review of Systems   Constitutional:  Negative for chills, fatigue and fever. HENT:  Negative for congestion, ear pain, rhinorrhea and sore throat. Eyes:  Negative for visual disturbance. Respiratory:  Negative for cough, shortness of breath and wheezing. Cardiovascular:  Negative for chest pain, palpitations and leg swelling. Gastrointestinal:  Negative for abdominal pain, diarrhea, nausea and vomiting. Genitourinary:  Negative for dysuria, frequency and hematuria. Musculoskeletal:  Negative for arthralgias, gait problem and myalgias. Skin:  Negative for rash. Neurological:  Negative for weakness, light-headedness and headaches. Hematological:  Negative for adenopathy. Psychiatric/Behavioral:  Negative for dysphoric mood. The patient is not nervous/anxious. Current Outpatient Medications   Medication Sig Dispense Refill    SYNTHROID 112 MCG Oral Tab Take 1 tablet (112 mcg total) by mouth before breakfast. 90 tablet 0    PEG 3350-KCl-Na Bicarb-NaCl 420 g Oral Recon Soln Take as directed by physician 4000 mL 0    Cholecalciferol (VITAMIN D) 1000 units Oral Tab Take by mouth. Vitamin B-12 1000 MCG Oral Tab Take 1 tablet (1,000 mcg total) by mouth daily.       Multiple Vitamin (ONE-DAILY MULTI VITAMINS) Oral Tab Take 1 tablet by mouth daily. Allergies: Allergies   Allergen Reactions    Pcn [Penicillins] RASH       Objective:   Physical Exam  Vitals and nursing note reviewed. Constitutional:       General: She is not in acute distress. Appearance: Normal appearance. She is well-developed. HENT:      Head: Normocephalic and atraumatic. Right Ear: Tympanic membrane, ear canal and external ear normal.      Left Ear: Tympanic membrane, ear canal and external ear normal.      Nose: Nose normal.      Mouth/Throat:      Mouth: Mucous membranes are moist.   Eyes:      Extraocular Movements: Extraocular movements intact. Conjunctiva/sclera: Conjunctivae normal.      Pupils: Pupils are equal, round, and reactive to light. Neck:      Thyroid: No thyromegaly. Cardiovascular:      Rate and Rhythm: Normal rate and regular rhythm. Pulses: Normal pulses. Heart sounds: Normal heart sounds. Pulmonary:      Effort: Pulmonary effort is normal.      Breath sounds: Normal breath sounds. No wheezing or rales. Abdominal:      General: Bowel sounds are normal. There is no distension. Palpations: Abdomen is soft. There is no mass. Tenderness: There is no abdominal tenderness. Musculoskeletal:         General: No tenderness. Normal range of motion. Cervical back: Normal range of motion and neck supple. Lymphadenopathy:      Cervical: No cervical adenopathy. Skin:     General: Skin is warm and dry. Findings: No rash. Neurological:      General: No focal deficit present. Mental Status: She is alert and oriented to person, place, and time. Psychiatric:         Mood and Affect: Mood normal.         Behavior: Behavior normal.         Assessment & Plan:   1. Routine general medical examination at a health care facility  Patient is generally healthy. Physical exam is unremarkable. Labs reviewed. Health maintenance issues discussed. Encouraged routine vaccines.   Advised healthy diet and regular exercise. Annual physicals. 2. Visit for screening mammogram  Due in 2024.  - [facility] DANNY 2D+3D SCREENING BILAT (SJS=[id number]); Future    3. Stress incontinence  Referral re-entered for pelvic floor therapy, last one . - Physical Therapy Referral - [facility]    4. Hypothyroidism, unspecified type  Due for recheck TSH in 2-3 months. Synthroid was recently adjusted.

## 2024-02-26 DIAGNOSIS — E03.9 HYPOTHYROIDISM, UNSPECIFIED TYPE: ICD-10-CM

## 2024-02-26 RX ORDER — LEVOTHYROXINE SODIUM 112 MCG
112 TABLET ORAL
Qty: 90 TABLET | Refills: 0 | Status: SHIPPED | OUTPATIENT
Start: 2024-02-26

## 2024-03-07 ENCOUNTER — TELEPHONE (OUTPATIENT)
Dept: PHYSICAL THERAPY | Facility: HOSPITAL | Age: 54
End: 2024-03-07

## 2024-03-11 ENCOUNTER — OFFICE VISIT (OUTPATIENT)
Dept: PHYSICAL THERAPY | Facility: HOSPITAL | Age: 54
End: 2024-03-11
Attending: PHYSICIAN ASSISTANT
Payer: COMMERCIAL

## 2024-03-11 DIAGNOSIS — N39.3 STRESS INCONTINENCE: Primary | ICD-10-CM

## 2024-03-11 PROCEDURE — 97161 PT EVAL LOW COMPLEX 20 MIN: CPT

## 2024-03-11 PROCEDURE — 97112 NEUROMUSCULAR REEDUCATION: CPT

## 2024-03-11 NOTE — PROGRESS NOTES
MUSCULOSKELETAL AND PELVIC FLOOR EVALUATION:     Diagnosis:   Stress incontinence (N39.3)         Referring Provider: Kt  Date of Evaluation:    3/11/2024    Precautions:  None Next MD visit:   none scheduled  Date of Surgery: n/a     PATIENT SUMMARY   Anabelle Parr is a 53 year old female  who presents to therapy today with complaints of urine leakage that has worsened over the past 5-10 years.  Current symptoms include: leakage    Pt describes pain level: current 0/10, best 0/10, worst 0/10.     Pregnant Now: No  Obstetrical/Gynecological history: : 3  Para: 2  Delivery method: vaginal  Occupation/Activities: HIIT couple of times/week, walking, has stopped running due to leakage, part time -sitting  PFDI-20: 52.09/300    Anabelle describes prior level of function less leakage. Pt goals include resolve leakage.  Past medical history was reviewed with Anabelle. Significant findings include  has a past medical history of Body piercing (), CERVICAL DYSPLASIA (, , 2008, ), Decorative tattoo (), Heartburn, Heavy menses (), Hemorrhoids (), HYPOTHYROIDISM, SEASONAL ALLERGIES, Sleep disturbance (), Stress (), Thyroid disease (), VARICELLA, and Wears glasses ().     URINARY HABITS  Types of symptoms: stress incontinence  Events associated with the onset of urinary complaints: childbirth  Abdominal/Vaginal Pressure complaints: no  Urinary Frequency: 6  Urine Stream: strong  Amount: normal  Leaking occurs: jumping, running, walking, sneezing  Episodes of Leakage: 2 times per day  Amount of leakage: med  Pad use: yes  Pad Change frequency: 2x/day-as needed  Nocturia: no  Fluid Intake: water  Bladder irritants: 1 caff  Urine Stop test: no  Post void dribble: no  Hovering: no  Empty bladder just in case: only when going on a long walk  Do you ever leak urine without knowing it? no    BOWEL HABITS  Types of symptoms: none   Frequency of bowel  movements: 1x/day  Stool consistency: Pratt Stool Scale: 4  Do you strain with defecation: No   Laxative use: No    SEXUAL HEALTH STATUS  No pain    ASSESSMENT  Anabelle presents to physical therapy evaluation with primary c/o urine leakage. The results of the objective tests and measures show  decreased pelvic floor muscle strength and coordination, fair pressure management/bladder habits  .  Functional deficits include but are not limited to embarrassment with leakage .  Signs and symptoms are consistent with diagnosis of Stress incontinence (N39.3)    . Pt and PT discussed evaluation findings, pathology, POC and HEP.  Pt voiced understanding and performs HEP correctly without reported pain. Skilled Pelvic Physical Therapy is medically necessary to address the above impairments and reach functional goals.    OBJECTIVE:   Posture: neutral  Pelvic Alignment: symmetrical   Gait: pt ambulates on level ground with normal mechanics.     External Palpation: no tenderness  Scars (location/surgery): none  Abdominal Wall Integrity: no restrictions     Range Of Motion  Lumbar AROM screen: wnl  LE AROM screen: grossly WNL     Strength (MMT) 5/5 WAQAS LE   Transverse Abdominis: 2/5    Flexibility Summary: WNL WAQAS LE     Informed consent for internal pelvic evaluation given: Yes    External Observation:   Voluntary contraction: present   Voluntary relaxation: present  Involuntary contraction: present  Involuntary relaxation: present    Mons pubis: WNL  Labia majora: WNL  Labia minora: WNL  Urethral meatus: WNL  Introitus: WNL  Perineal body: WNL    Sensory/Reflex:  Vestibule: normal bilaterally  Anal [place]: Not Tested    Internal Examination   Scar: none    Pelvic Floor Muscle strength: (PERF= Power/Endurance/Reps/Fast) MMT: 3/3/4/3  External Anal Sphincter: nt  Accessory Muscle Use: abdominals    Tissue Laxity Test:  Anterior Wall: WNL  Posterior Wall: WNL  Apical: WNL    Eccentric lengthening contraction: wnl  Bearing down  Valsalva maneuver (2-3x): wnl    Internal Palpation: WNL except slight increased tension R superficial and deep pelvic floor muscles     Today's Treatment and Response:   Patient education was provided on objective findings of external and internal evaluation and expectations with treatment outcomes. Educated on pelvic anatomy and function with diagrams and pelvic model, bladder normatives, adequate hydration levels, proper toileting posture, instructed in bladder, bowel, and diet diary log and issued handout , stress/urge urinary incontinence strategies, coordination of diaphragmatic breathing and pelvic floor contraction , and knack/pelvic muscle brace, pressure management with \"MORRISON\" breath    Manual therapy/NM Re-ed- internal retraining pelvic floor muscles to learn to perform diaphragmatic breathing , yobany's     Patient was instructed in and issued a HEP for: diet/bladder/bowel diary, diaphragmatic breathing x10, diaphragmatic breathing /kegel x1 min, Kegel 10 repetitions 3x/day, 10 sec on/10 sec off, 2 sec on 2-4 sec off      Charges: PT Eval Low Complexity, NMR (Phillip included)      Total Timed Treatment: 20 min     Total Treatment Time: 45 min       PLAN OF CARE:    Goals: (to be met in 10 visits)  1. Independent in HEP and progression with improved understanding of bladder/bowel health, bladder retraining and long-term management.    2. Patient will demonstrate improved bladder voiding habits to voiding every 2 hours with less urinary leakage.  3. Patient will demonstrate improve PFM isolation, coordination and strength so she is able to reduce leakage during ADL's.  4. Pt will have increased transverse abdominis muscle strength to 3/5 to assist with supporting pelvic floor muscles.   5. PFM contraction before increase intra-abdominal pressure.      Frequency / Duration: Patient will be seen for 1 x/week or a total of 10 visits over a 90 day period.  Treatment will include: Manual Therapy, Neuromuscular  Re-education, Self-Care Home Management, Therapeutic Activities, Therapeutic Exercise, and Home Exercise Program instruction     Education or treatment limitation: None  Rehab Potential:good      Patient/Family/Caregiver was advised of these findings, precautions, and treatment options and has agreed to actively participate in planning and for this course of care.    Thank you for your referral. Please co-sign or sign and return this letter via fax as soon as possible to [phone]. If you have any questions, please contact me at Dept: [phone]    Sincerely,  Electronically signed by therapist: Abena Polanco PT  Physician's certification required: Yes  I certify the need for these services furnished under this plan of treatment and while under my care.    X___________________________________________________ Date____________________    Certification From: 3/11/2024  To:6/9/2024

## 2024-03-26 ENCOUNTER — OFFICE VISIT (OUTPATIENT)
Dept: PHYSICAL THERAPY | Facility: HOSPITAL | Age: 54
End: 2024-03-26
Attending: PHYSICIAN ASSISTANT
Payer: COMMERCIAL

## 2024-03-26 ENCOUNTER — HOSPITAL ENCOUNTER (OUTPATIENT)
Dept: MAMMOGRAPHY | Age: 54
Discharge: HOME | End: 2024-03-26
Attending: PHYSICIAN ASSISTANT
Payer: COMMERCIAL

## 2024-03-26 DIAGNOSIS — Z12.31 VISIT FOR SCREENING MAMMOGRAM: ICD-10-CM

## 2024-03-26 PROCEDURE — 97110 THERAPEUTIC EXERCISES: CPT

## 2024-03-26 PROCEDURE — 77067 SCR MAMMO BI INCL CAD: CPT | Performed by: PHYSICIAN ASSISTANT

## 2024-03-26 PROCEDURE — 97112 NEUROMUSCULAR REEDUCATION: CPT

## 2024-03-26 PROCEDURE — 77063 BREAST TOMOSYNTHESIS BI: CPT | Performed by: PHYSICIAN ASSISTANT

## 2024-03-26 NOTE — PROGRESS NOTES
Diagnosis:   Stress incontinence (N39.3)      Referring Provider: Cesia Meraz  Date of Evaluation:    3/11/2024    Precautions:  None Next MD visit:   none scheduled  Date of Surgery: n/a   Insurance Primary/Secondary: [employer] [place] PPO / N/A     # Auth Visits: no auth            Subjective:  Had only 1 incident of lekage when doing a burpee this past week.  Doing HEP.    Current symptoms include: leakage-Initial evaluation:        PFDI-20: 52.09/300-Initial evaluation:     Pain: 0/10      Objective: Tx flow sheet     Initial evaluation:     URINARY HABITS  Types of symptoms: stress incontinence  Events associated with the onset of urinary complaints: childbirth  Abdominal/Vaginal Pressure complaints: no  Urinary Frequency: 6  Leaking occurs: jumping, running, walking, sneezing  Episodes of Leakage: 2 times per day  Amount of leakage: med  Pad use: yes  Pad Change frequency: 2x/day-as needed  Empty bladder just in case: only when going on a long walk      BOWEL HABITS  Types of symptoms: none   Frequency of bowel movements: 1x/day  Stool consistency: [place] Stool Scale: 4    SEXUAL HEALTH STATUS  No pain    Strength (MMT) 5/5 WAQAS LE   Transverse Abdominis: 2/5    Flexibility Summary: WNL WAQAS LE     Informed consent for internal pelvic evaluation given: Yes    External Observation:   Voluntary contraction: present   Voluntary relaxation: present  Involuntary contraction: present  Involuntary relaxation: present    Mons pubis: WNL  Labia majora: WNL  Labia minora: WNL  Urethral meatus: WNL  Introitus: WNL  Perineal body: WNL    Sensory/Reflex:  Vestibule: normal bilaterally  Anal [place]: Not Tested    Internal Examination   Scar: none    Pelvic Floor Muscle strength: (PERF= Power/Endurance/Reps/Fast) MMT: 3/3/4/3  External Anal Sphincter: nt  Accessory Muscle Use: abdominals    Tissue Laxity Test:  Anterior Wall: WNL  Posterior Wall: WNL  Apical: WNL    Eccentric lengthening contraction: wnl  Bearing down Valsalva  maneuver (2-3x): wnl    Internal Palpation: WNL except slight increased tension R superficial and deep pelvic floor muscles       Assessment:     Improved coordination of pelvic floor muscles via fascially connected mm.  Pt appeared to understand breathing regulation an pressure management strategies with graded ex.  Upgraded HEP.      Goals:   Goals: (to be met in 10 visits)-progressing  1. Independent in HEP and progression with improved understanding of bladder/bowel health, bladder retraining and long-term management.    2. Patient will demonstrate improved bladder voiding habits to voiding every 2 hours with less urinary leakage.  3. Patient will demonstrate improve PFM isolation, coordination and strength so she is able to reduce leakage during ADL's.  4. Pt will have increased transverse abdominis muscle strength to 3/5 to assist with supporting pelvic floor muscles.   5. PFM contraction before increase intra-abdominal pressure.    Plan: Continue plan of care as pt tolerates with focus on pelvic floor muscles . Next visit: biofeedback    Date: 3/26/2024  TX#: 2/10 Date:                 TX#: 3/ Date:                 TX#: 4/ Date:                 TX#: 5/ Date:   Tx#: 6/   PFM NM  Re-ed     Bladder diary  Bladder fitness+ handout    Breathing regulation    Graded exercise    Pressure management PFM NM  Re-ed     biofeedback       Ther Ex:   diaphragmatic breathing     Abdominal bracing    Kegel    Kegel +  iso hip add  10\" x10     Kegel + articulating bridge x10     Kegel +  Resisted   ER RTB  x10     Kegel + clamshell RTB x10 R/L      Kegel +  U/LE lift  x10     Kegel with plie/ER resisted RTB x10     Issued HEP and RTB    1/2 jumping cassy    Sitting, standing kegel's                           HEP: Kegel 10 repetitions 3x/day, 10 sec on/10 sec off, 2 sec on 2-4 sec off , diaphragmatic breathing x10, + additions above     Charges: NMR x15, Tex2 30'       Total Timed Treatment: 45 min  Total Treatment Time: 45  min

## 2024-03-28 ENCOUNTER — TELEPHONE (OUTPATIENT)
Dept: FAMILY MEDICINE CLINIC | Facility: CLINIC | Age: 54
End: 2024-03-28

## 2024-03-28 DIAGNOSIS — R92.333 HETEROGENEOUSLY DENSE TISSUE OF BOTH BREASTS ON MAMMOGRAPHY: Primary | ICD-10-CM

## 2024-03-28 NOTE — TELEPHONE ENCOUNTER
Pt did her mammo a few days ago.  She said it's typically followed with a breast US, but was not this time.  Pt asking if Cesia suggests she have the US done?

## 2024-03-28 NOTE — TELEPHONE ENCOUNTER
Left detailed message. Breast US order has been entered in [employer] system. Provided [phone] to call and schedule.

## 2024-04-03 ENCOUNTER — OFFICE VISIT (OUTPATIENT)
Dept: PHYSICAL THERAPY | Facility: HOSPITAL | Age: 54
End: 2024-04-03
Attending: PHYSICIAN ASSISTANT
Payer: COMMERCIAL

## 2024-04-03 PROCEDURE — 97110 THERAPEUTIC EXERCISES: CPT

## 2024-04-03 PROCEDURE — 97112 NEUROMUSCULAR REEDUCATION: CPT

## 2024-04-03 NOTE — PROGRESS NOTES
Diagnosis:   Stress incontinence (N39.3)      Referring Provider: Cesia Meraz  Date of Evaluation:    3/11/2024    Precautions:  None Next MD visit:   none scheduled  Date of Surgery: n/a   Insurance Primary/Secondary: [employer] [place] / N/A     # Auth Visits: no auth            Subjective:  Had only 1 incident of leakage when coughing repetitively.   Has been successful with no leakage and incorporating pressure management/kegel's.  Doing rowing for work out with no leakage.      PFDI-20: 52.09/300-Initial evaluation    Pain: 0/10      Objective: Tx flow sheet     Biofeedback: Recruitment good, holding ability fair-tonic, good phasic, derecruitment good-tonic, fair phasic, baseline between contractions 0-5.0Ua, baseline resting average flatlineUa (normal 2.0Ua)    Observed: excessive abdominal contraction, hip adductor and glut contractions with kegel's    Initial evaluation with updates that are dated    URINARY HABITS  Types of symptoms:   stress incontinence  Urinary Frequency: 6  Leaking occurs: this past week only 1 occurrence of leakage with coughing repetitively-4/3/2024    jumping, running, walking, sneezing  Episodes of Leakage: 2 times per day  Amount of leakage: small (was: med)  Pad use: yes  Pad Change frequency: 2x/day-as needed  Empty bladder just in case: only when going on a long walk      BOWEL HABITS  Types of symptoms: none   Frequency of bowel movements: 1x/day  Stool consistency: [place] Stool Scale: 4    SEXUAL HEALTH STATUS  No pain    Strength (MMT) 5/5 WAQAS LE   Transverse Abdominis: 2/5    Flexibility Summary: WNL WAQAS LE     Informed consent for internal pelvic evaluation given: Yes    External Observation:   Voluntary contraction: present   Voluntary relaxation: present  Involuntary contraction: present  Involuntary relaxation: present    Mons pubis: WNL  Labia majora: WNL  Labia minora: WNL  Urethral meatus: WNL  Introitus: WNL  Perineal body: WNL    Sensory/Reflex:  Vestibule: normal  bilaterally  Anal [place]: Not Tested    Internal Examination   Scar: none    Pelvic Floor Muscle strength: (PERF= Power/Endurance/Reps/Fast) MMT: 3/3/4/3  External Anal Sphincter: nt  Accessory Muscle Use: abdominals    Tissue Laxity Test:  Anterior Wall: WNL  Posterior Wall: WNL  Apical: WNL    Eccentric lengthening contraction: wnl  Bearing down Valsalva maneuver (2-3x): wnl    Internal Palpation: WNL except slight increased tension R superficial and deep pelvic floor muscles       Assessment:   Improved coordination of pelvic floor muscles via biofeedback with visual and verbal cueing.  Pt able to isolate pelvic floor muscles more effectively after retraining.  Pt appeared to understand breathing regulation and pressure management strategies with graded ex.        Goals:   Goals: (to be met in 10 visits)-progressing  1. Independent in HEP and progression with improved understanding of bladder/bowel health, bladder retraining and long-term management.    2. Patient will demonstrate improved bladder voiding habits to voiding every 2 hours with less urinary leakage.  3. Patient will demonstrate improve PFM isolation, coordination and strength so she is able to reduce leakage during ADL's.  4. Pt will have increased transverse abdominis muscle strength to 3/5 to assist with supporting pelvic floor muscles.   5. PFM contraction before increase intra-abdominal pressure.    Plan: Continue plan of care as pt tolerates with focus on pelvic floor muscles . Next visit: pelvic brace w sim activities of daily living    Date: 3/26/2024  TX#: 2/10 Date:        4/3/2024         TX#: 3/ Date:                 TX#: 4/ Date:                 TX#: 5/ Date:   Tx#: 6/   PFM NM  Re-ed     Bladder diary  Bladder fitness+ handout    Breathing regulation    Graded exercise    Pressure management PFM NM  Re-ed     biofeedback -  external sensors were placed and leads were attached  Resting tone  Tonic  Phasic  [place]  With coordinated  breathing    kegel +  *Sit  *March  *Stand  *sit<>stand    Biofeedback with constant interpretation of results.     pelvic brace      Ther Ex:   diaphragmatic breathing     Abdominal bracing    Kegel    Kegel +  iso hip add  10\" x10     Kegel + articulating bridge x10     Kegel +  Resisted   ER RTB  x10     Kegel + clamshell RTB x10 R/L      Kegel +  U/LE lift  x10     Kegel with plie/ER resisted RTB x10     Issued HEP and RTB    1/2 jumping cassy    Sitting, standing kegel's       Ther Ex:   diaphragmatic breathing   abdominal bracing   kegel's                      HEP: Kegel 10 repetitions 3x/day, 10 sec on/10 sec off, 2 sec on 2-4 sec off , diaphragmatic breathing x10, + additions above     Charges: NMRx2 x30, Tex1 15'       Total Timed Treatment: 45 min  Total Treatment Time: 45 min

## 2024-04-08 ENCOUNTER — OFFICE VISIT (OUTPATIENT)
Dept: PHYSICAL THERAPY | Facility: HOSPITAL | Age: 54
End: 2024-04-08
Attending: PHYSICIAN ASSISTANT
Payer: COMMERCIAL

## 2024-04-08 PROCEDURE — 97110 THERAPEUTIC EXERCISES: CPT

## 2024-04-08 PROCEDURE — 97112 NEUROMUSCULAR REEDUCATION: CPT

## 2024-04-08 NOTE — PROGRESS NOTES
Diagnosis:   Stress incontinence (N39.3)      Referring Provider: Cesia Meraz  Date of Evaluation:    3/11/2024    Precautions:  None Next MD visit:   none scheduled  Date of Surgery: n/a   Insurance Primary/Secondary: [employer] [place] / N/A     # Auth Visits: no auth            Subjective:  No leakage this past week.  Doing rowing machine every day.   Has been successful with no leakage and incorporating pressure management/kegel's.  Hasn't tried to do jumping/running yet.      PFDI-20: 52.09/300-Initial evaluation    Pain: 0/10      Objective: Tx flow sheet     4/3/2024  Biofeedback: Recruitment good, holding ability fair-tonic, good phasic, derecruitment good-tonic, fair phasic, baseline between contractions 0-5.0Ua, baseline resting average flatlineUa (normal 2.0Ua)    Observed: excessive abdominal contraction, hip adductor and glut contractions with kegel's    Initial evaluation with updates that are dated    URINARY HABITS  Types of symptoms:   stress incontinence  Urinary Frequency: 6  Leaking occurs: this past week only 1 occurrence of leakage with coughing repetitively-4/3/2024    jumping, running, walking, sneezing  Episodes of Leakage: 0x-hasn't tried jumping (was:2 times per day)-4/8/2024   Amount of leakage: small (was: med)  Pad use: yes  Pad Change frequency: 2x/day-as needed  Empty bladder just in case: only when going on a long walk      BOWEL HABITS  Types of symptoms: none   Frequency of bowel movements: 1x/day  Stool consistency: [place] Stool Scale: 4    SEXUAL HEALTH STATUS  No pain    Strength (MMT) 5/5 WAQAS LE   Transverse Abdominis: 2/5    Flexibility Summary: WNL WAQAS LE     Informed consent for internal pelvic evaluation given: Yes    External Observation:   Voluntary contraction: present   Voluntary relaxation: present  Involuntary contraction: present  Involuntary relaxation: present    Mons pubis: WNL  Labia majora: WNL  Labia minora: WNL  Urethral meatus: WNL  Introitus: WNL  Perineal  body: WNL    Sensory/Reflex:  Vestibule: normal bilaterally  Anal [place]: Not Tested    Internal Examination   Scar: none    Pelvic Floor Muscle strength: (PERF= Power/Endurance/Reps/Fast) MMT: 3/3/4/3  External Anal Sphincter: nt  Accessory Muscle Use: abdominals    Tissue Laxity Test:  Anterior Wall: WNL  Posterior Wall: WNL  Apical: WNL    Eccentric lengthening contraction: wnl  Bearing down Valsalva maneuver (2-3x): wnl    Internal Palpation: WNL except slight increased tension R superficial and deep pelvic floor muscles       Assessment:   Improved coordination of pelvic floor muscles via simulated activities of daily living .  Pt had some sensation of urgency and almost had leakage with jumping forward initially that improved with repetition and improved diaphragmatic breathing /pressure management.  Upgraded HEP to include intermittent running.  Goals progressing as expected.         Goals:   Goals: (to be met in 10 visits)-progressing  1. Independent in HEP and progression with improved understanding of bladder/bowel health, bladder retraining and long-term management.    2. Patient will demonstrate improved bladder voiding habits to voiding every 2 hours with less urinary leakage.  3. Patient will demonstrate improve PFM isolation, coordination and strength so she is able to reduce leakage during ADL's.  4. Pt will have increased transverse abdominis muscle strength to 3/5 to assist with supporting pelvic floor muscles.   5. PFM contraction before increase intra-abdominal pressure.    Plan: Continue plan of care as pt tolerates with focus on pelvic floor muscles . Next visit: pelvic brace w sim activities of daily living  -running  Date: 3/26/2024  TX#: 2/10 Date:         4/3/2024        TX#: 3/ Date:           4/8/2024       TX#: 4/ Date:                 TX#: 5/ Date:   Tx#: 6/   PFM NM  Re-ed     Bladder diary  Bladder fitness+ handout    Breathing regulation    Graded exercise    Pressure management PFM  NM  Re-ed     biofeedback -  external sensors were placed and leads were attached  Resting tone  Tonic  Phasic  [place]  With coordinated breathing    kegel +  *Sit  *March  *Stand  *sit<>stand    Biofeedback with constant interpretation of results.   PFM NM  Re-ed     Mindi/phys    Breathing management    pelvic brace     Lifting-  5#, 10# 15# w pelvic brace with coordinated breathing -deadlift x10 ea    Single leg jump forward w pelvic brace R/L x10    Ladder-  FW, lateral jumping    Quick feet w pelvic brace and with coordinated breathing          Ther Ex:   diaphragmatic breathing     Abdominal bracing    Kegel    Kegel +  iso hip add  10\" x10     Kegel + articulating bridge x10     Kegel +  Resisted   ER RTB  x10     Kegel + clamshell RTB x10 R/L      Kegel +  U/LE lift  x10     Kegel with plie/ER resisted RTB x10     Issued HEP and RTB    1/2 jumping cassy    Sitting, standing kegel's       Ther Ex:   diaphragmatic breathing   abdominal bracing   kegel's   Ther Ex:   Kegel + articulating bridge x10     Standing-  Kegel + iso hip add + squat w ball behind back x20     Shuttle- DLP  31# with pelvic brace + iso hip adduction x30 reps     Elliptical L5 5 min     HEP: Kegel 10 repetitions 3x/day, 10 sec on/10 sec off, 2 sec on 2-4 sec off , diaphragmatic breathing x10, + additions above     Charges: NMRx2 x30, Tex1 15'       Total Timed Treatment: 45 min  Total Treatment Time: 45 min

## 2024-04-16 ENCOUNTER — OFFICE VISIT (OUTPATIENT)
Dept: PHYSICAL THERAPY | Facility: HOSPITAL | Age: 54
End: 2024-04-16
Attending: PHYSICIAN ASSISTANT
Payer: COMMERCIAL

## 2024-04-16 PROCEDURE — 97110 THERAPEUTIC EXERCISES: CPT

## 2024-04-16 PROCEDURE — 97112 NEUROMUSCULAR REEDUCATION: CPT

## 2024-04-16 NOTE — PROGRESS NOTES
Diagnosis:   Stress incontinence (N39.3)      Referring Provider: Cesia Meraz  Date of Evaluation:    3/11/2024    Precautions:  None Next MD visit:   none scheduled  Date of Surgery: n/a   Insurance Primary/Secondary: [employer] [place] / N/A     # Auth Visits: no auth            Subjective:  Doing rowing machine every day.   Had a little bit of leakage when doing side ladder but thinks that she was going too fast.   Had another incident when she felt like she was having leakage but when to the bathroom and had no leakage.  Walked uphill a lot over the weekend and didn't have leakage.  Has been successful with no leakage and incorporating pressure management/kegel's.      PFDI-20: 52.09/300-Initial evaluation    Pain: 0/10      Objective: Tx flow sheet   4/16/2024   Pt demonstrated correct diaphragmatic breathing at rest  4/3/2024  Biofeedback: Recruitment good, holding ability fair-tonic, good phasic, derecruitment good-tonic, fair phasic, baseline between contractions 0-5.0Ua, baseline resting average flatlineUa (normal 2.0Ua)    Observed: excessive abdominal contraction, hip adductor and glut contractions with kegel's    Initial evaluation with updates that are dated    URINARY HABITS  Types of symptoms:   stress incontinence  Urinary Frequency: 6  Leaking occurs: this past week only 1 occurrence of leakage with coughing repetitively-4/3/2024    jumping, running, walking, sneezing  Episodes of Leakage: 0x-hasn't tried jumping (was:2 times per day)-4/8/2024   Amount of leakage: small (was: med)  Pad use: yes  Pad Change frequency: 2x/day-as needed  Empty bladder just in case: only when going on a long walk      BOWEL HABITS  Types of symptoms: none   Frequency of bowel movements: 1x/day  Stool consistency: [place] Stool Scale: 4    SEXUAL HEALTH STATUS  No pain    Strength (MMT) 5/5 WAQAS LE   Transverse Abdominis: 2/5    Flexibility Summary: WNL WAQAS LE     Informed consent for internal pelvic evaluation given:  Yes    External Observation:   Voluntary contraction: present   Voluntary relaxation: present  Involuntary contraction: present  Involuntary relaxation: present    Mons pubis: WNL  Labia majora: WNL  Labia minora: WNL  Urethral meatus: WNL  Introitus: WNL  Perineal body: WNL    Sensory/Reflex:  Vestibule: normal bilaterally  Anal [place]: Not Tested    Internal Examination   Scar: none    Pelvic Floor Muscle strength: (PERF= Power/Endurance/Reps/Fast) MMT: 3/3/4/3  External Anal Sphincter: nt  Accessory Muscle Use: abdominals    Tissue Laxity Test:  Anterior Wall: WNL  Posterior Wall: WNL  Apical: WNL    Eccentric lengthening contraction: wnl  Bearing down Valsalva maneuver (2-3x): wnl    Internal Palpation: WNL except slight increased tension R superficial and deep pelvic floor muscles       Assessment:   Improved coordination of pelvic floor muscles via simulated activities of daily living without leakage . Pt had 1 incident of min leakage with lateral jumping ladder and 2 incidents of feeling like she was going to have leakage when running and jumping on trampoline. Jumping continues to be activity when pt has leakage but improving overall.  Upgraded HEP to include intermittent running.  Goals progressing as expected.         Goals:   Goals: (to be met in 10 visits)-progressing  1. Independent in HEP and progression with improved understanding of bladder/bowel health, bladder retraining and long-term management.    2. Patient will demonstrate improved bladder voiding habits to voiding every 2 hours with less urinary leakage.  3. Patient will demonstrate improve PFM isolation, coordination and strength so she is able to reduce leakage during ADL's.  4. Pt will have increased transverse abdominis muscle strength to 3/5 to assist with supporting pelvic floor muscles.   5. PFM contraction before increase intra-abdominal pressure.    Plan: Continue plan of care as pt tolerates with focus on pelvic floor muscles . Next  visit: pelvic brace w sim activities of daily living  -running  Date: 3/26/2024  TX#: 2/10 Date:         4/3/2024        TX#: 3/ Date:           4/8/2024       TX#: 4/ Date:       4/16/2024           TX#: 5/ Date:   Tx#: 6/ Date:   Tx#: 7/ Date:   Tx#: 8/ Date:   Tx#: 9/ Date:   Tx#: 10/   PFM NM  Re-ed     Bladder diary  Bladder fitness+ handout    Breathing regulation    Graded exercise    Pressure management [facility]  Re-ed     biofeedback -  external sensors were placed and leads were attached  Resting tone  Tonic  Phasic  [place]  With coordinated breathing    kegel +  *Sit  *March  *Stand  *sit<>stand    Biofeedback with constant interpretation of results.   [facility]  [facility]     Mindi/phys    Breathing management    pelvic brace     Lifting-  5#, 10# 15# w pelvic brace with coordinated breathing -deadlift x10 ea    Single leg jump forward w pelvic brace R/L x10    Ladder-with coordinated breathing   FW, lateral jumping    Quick feet w pelvic brace and with coordinated breathing      [facility]  Re-ed     Mindi/phys    Breathing management    pelvic brace     Lifting-  15# w pelvic brace with coordinated breathing -deadlift x10 ea    Ladder-with coordinated breathing   FW, lateral jumping    [place]pees -modified  X10 w pelvic brace with coordinated breathing     Modified jumping jacks 2x10 w pelvic brace with coordinated breathing     Trampoline modified jumping jacks with coordinated breathing             Ther Ex:   diaphragmatic breathing     Abdominal bracing    Kegel    Kegel +  iso hip add  10\" x10     Kegel + articulating bridge x10     Kegel +  Resisted   ER RTB  x10     Kegel + clamshell RTB x10 R/L      Kegel +  U/LE lift  x10     Kegel with plie/ER resisted RTB x10     Issued HEP and RTB    1/2 jumping cassy    Sitting, standing kegel's       Ther Ex:   diaphragmatic breathing   abdominal bracing   kegel's   Ther Ex:   Kegel + articulating bridge x10     Standing-  Kegel + iso hip add + squat w ball behind back x20      Shuttle- DLP  31# with pelvic brace + iso hip adduction x30 reps     Elliptical L5 5 min Ther Ex:   Sit on ball-  Kegel tonic  Kegel +  U/LE lift  x10     Elliptical L5 3 min    Treadmill-  3.1-3.9 mph-  Intermittent varying speeds 7 min                HEP: Kegel 10 repetitions 3x/day, 10 sec on/10 sec off, 2 sec on 2-4 sec off , diaphragmatic breathing x10, + additions above     Charges: NMRx2 30', Tex1 15'       Total Timed Treatment: 45 min  Total Treatment Time: 45 min

## 2024-04-22 ENCOUNTER — OFFICE VISIT (OUTPATIENT)
Dept: PHYSICAL THERAPY | Facility: HOSPITAL | Age: 54
End: 2024-04-22
Attending: PHYSICIAN ASSISTANT
Payer: COMMERCIAL

## 2024-04-22 PROCEDURE — 97112 NEUROMUSCULAR REEDUCATION: CPT

## 2024-04-22 PROCEDURE — 97110 THERAPEUTIC EXERCISES: CPT

## 2024-04-22 NOTE — PROGRESS NOTES
Diagnosis:   Stress incontinence (N39.3)      Referring Provider: Cesia Meraz  Date of Evaluation:    3/11/2024    Precautions:  None Next MD visit:   none scheduled  Date of Surgery: n/a   Insurance Primary/Secondary: [employer] [place] PPO / N/A     # Auth Visits: no auth            Subjective:  Doing rowing machine every day.   Walked fast over the weekend and didn't have any leakage.  Has been successful with no leakage and incorporating pressure management/kegel's, but hasn't been jogging.  Happy with progress in physical therapy thus far.      PFDI-20: 52.09/300-Initial evaluation    Pain: 0/10      Objective: Tx flow sheet     4/22/2024   4 pt-increased flexion scapular region in 4 point  Sitting-rounded shoulders/increased thoracic flexion interscapular region    4/16/2024   Pt demonstrated correct diaphragmatic breathing at rest  4/3/2024  Biofeedback: Recruitment good, holding ability fair-tonic, good phasic, derecruitment good-tonic, fair phasic, baseline between contractions 0-5.0Ua, baseline resting average flatlineUa (normal 2.0Ua)    Observed: excessive abdominal contraction, hip adductor and glut contractions with kegel's    Initial evaluation with updates that are dated    URINARY HABITS  Types of symptoms:   stress incontinence  Urinary Frequency: 6  Leaking occurs: this past week only 1 occurrence of leakage with coughing repetitively-4/3/2024    jumping, running, walking, sneezing  Episodes of Leakage: 0x-hasn't tried jumping (was:2 times per day)-4/8/2024   Amount of leakage: small (was: med)  Pad use: yes  Pad Change frequency: 2x/day-as needed  Empty bladder just in case: only when going on a long walk      BOWEL HABITS  Types of symptoms: none   Frequency of bowel movements: 1x/day  Stool consistency: [place] Stool Scale: 4    SEXUAL HEALTH STATUS  No pain    Strength (MMT) 5/5 WAQAS LE   Transverse Abdominis: 2/5    Flexibility Summary: WNL WAQAS LE     Informed consent for internal pelvic evaluation  given: Yes    External Observation:   Voluntary contraction: present   Voluntary relaxation: present  Involuntary contraction: present  Involuntary relaxation: present    Mons pubis: WNL  Labia majora: WNL  Labia minora: WNL  Urethral meatus: WNL  Introitus: WNL  Perineal body: WNL    Sensory/Reflex:  Vestibule: normal bilaterally  Anal [place]: Not Tested    Internal Examination   Scar: none    Pelvic Floor Muscle strength: (PERF= Power/Endurance/Reps/Fast) MMT: 3/3/4/3  External Anal Sphincter: nt  Accessory Muscle Use: abdominals    Tissue Laxity Test:  Anterior Wall: WNL  Posterior Wall: WNL  Apical: WNL    Eccentric lengthening contraction: wnl  Bearing down Valsalva maneuver (2-3x): wnl    Internal Palpation: WNL except slight increased tension R superficial and deep pelvic floor muscles       Assessment:   Improved coordination of pelvic floor muscles via simulated activities of daily living without leakage . Pt had 1 incident of min leakage with lateral jumping ladder and 1 incident of min leakage with running.  Jumping continues to be activity when pt has leakage but improving overall.  Pt instructed to perform intermittent walk/lite jogging at home for improved pelvic floor muscle/bladder coordination.  Observed correct breathing for pressure management. Added: thoracic mobility exercise and back exercises to improve neutral posture.   No adverse reaction reported.    Goals progressing as expected.         Goals:   Goals: (to be met in 10 visits)-progressing  1. Independent in HEP and progression with improved understanding of bladder/bowel health, bladder retraining and long-term management.    2. Patient will demonstrate improved bladder voiding habits to voiding every 2 hours with less urinary leakage.  3. Patient will demonstrate improve PFM isolation, coordination and strength so she is able to reduce leakage during ADL's.  4. Pt will have increased transverse abdominis muscle strength to 3/5 to assist  with supporting pelvic floor muscles.   5. PFM contraction before increase intra-abdominal pressure.    Plan: Continue plan of care as pt tolerates with focus on pelvic floor muscles . Next visit: pelvic brace w sim activities of daily living  -intermittent running  Date: 3/26/2024  TX#: 2/10 Date:         4/3/2024        TX#: 3/ Date:           4/8/2024       TX#: 4/ Date:       4/16/2024           TX#: 5/ Date: 4/22/2024   Tx#: 6/ Date:   Tx#: 7/ Date:   Tx#: 8/ Date:   Tx#: 9/ Date:   Tx#: 10/   PFM NM  Re-ed     Bladder diary  Bladder fitness+ handout    Breathing regulation    Graded exercise    Pressure management PFM NM  Re-ed     biofeedback -  external sensors were placed and leads were attached  Resting tone  Tonic  Phasic  [place]  With coordinated breathing    kegel +  *Sit  *March  *Stand  *sit<>stand    Biofeedback with constant interpretation of results.   PFM NM  Re-ed     Mindi/phys    Breathing management    pelvic brace     Lifting-  5#, 10# 15# w pelvic brace with coordinated breathing -deadlift x10 ea    Single leg jump forward w pelvic brace R/L x10    Ladder-with coordinated breathing   FW, lateral jumping    Quick feet w pelvic brace and with coordinated breathing      PFM NM  Re-ed     Mindi/phys    Breathing management    pelvic brace     Lifting-  15# w pelvic brace with coordinated breathing -deadlift x10 ea    Ladder-with coordinated breathing   FW, lateral jumping    Burpees -modified  X10 w pelvic brace with coordinated breathing     Modified jumping jacks 2x10 w pelvic brace with coordinated breathing     Trampoline modified jumping jacks with coordinated breathing      PFM NM  Re-ed     Mindi/phys    Breathing management    pelvic brace     Lifting-  5#, 10#, 15# w pelvic brace with coordinated breathing -deadlift x10 ea    Ladder-with coordinated breathing   FW, lateral jumping, large stepping    Burpees -modified  X10 w pelvic brace with coordinated breathing     March airex with  coordinated breathing x1'    Alt lunge BOSU with coordinated breathing x20    Posture ed             Ther Ex:   diaphragmatic breathing     Abdominal bracing    Kegel    Kegel +  iso hip add  10\" x10     Kegel + articulating bridge x10     Kegel +  Resisted   ER RTB  x10     Kegel + clamshell RTB x10 R/L      Kegel +  U/LE lift  x10     Kegel with plie/ER resisted RTB x10     Issued HEP and RTB    1/2 jumping cassy    Sitting, standing kegel's       Ther Ex:   diaphragmatic breathing   abdominal bracing   kegel's   Ther Ex:   Kegel + articulating bridge x10     Standing-  Kegel + iso hip add + squat w ball behind back x20     Shuttle- DLP  31# with pelvic brace + iso hip adduction x30 reps     Elliptical L5 5 min Ther Ex:   Sit on ball-  Kegel tonic  Kegel +  U/LE lift  x10     Elliptical L5 3 min    Treadmill-  3.1-3.9 mph-  Intermittent varying speeds 7 min         Ther Ex:   Sit on ball-  Kegel tonic  Kegel +  U/LE lift  x10     4 pt-  U/LE lift 3 sec hold x10    FR-thoracic extension x20    Elliptical L5 3 min    Treadmill-  3.1-3.9 mph  Intermittent varying speeds 5 min       HEP: Kegel 10 repetitions 3x/day, 10 sec on/10 sec off, 2 sec on 2-4 sec off , diaphragmatic breathing x10, + additions above     Charges: NMRx2 30', Tex1 15'       Total Timed Treatment: 45 min  Total Treatment Time: 45 min

## 2024-04-29 ENCOUNTER — OFFICE VISIT (OUTPATIENT)
Dept: PHYSICAL THERAPY | Facility: HOSPITAL | Age: 54
End: 2024-04-29
Attending: PHYSICIAN ASSISTANT
Payer: COMMERCIAL

## 2024-04-29 PROCEDURE — 97110 THERAPEUTIC EXERCISES: CPT

## 2024-04-29 PROCEDURE — 97112 NEUROMUSCULAR REEDUCATION: CPT

## 2024-04-29 NOTE — PROGRESS NOTES
Diagnosis:   Stress incontinence (N39.3)      Referring Provider: Cesia Meraz  Date of Evaluation:    3/11/2024    Precautions:  None Next MD visit:   none scheduled  Date of Surgery: n/a   Insurance Primary/Secondary: [employer] [place] PPO / N/A     # Auth Visits: no auth            Subjective:  Went for a brisk walk yesterday and had some leakage for a few steps only with the transitioning down hill   Feels like 1 side of back muscles is stronger than the other one  No back pain today.      PFDI-20: 52.09/300-Initial evaluation    Pain: 0/10      Objective: Tx flow sheet     4/22/2024   4 pt-increased flexion scapular region in 4 point  Sitting-rounded shoulders/increased thoracic flexion interscapular region    4/16/2024   Pt demonstrated correct diaphragmatic breathing at rest  4/3/2024  Biofeedback: Recruitment good, holding ability fair-tonic, good phasic, derecruitment good-tonic, fair phasic, baseline between contractions 0-5.0Ua, baseline resting average flatlineUa (normal 2.0Ua)    Observed: excessive abdominal contraction, hip adductor and glut contractions with yobany's    Initial evaluation with updates that are dated    URINARY HABITS  Types of symptoms:   stress incontinence  Urinary Frequency: 6  Leaking occurs: this past week only 1 occurrence of leakage with coughing repetitively-4/3/2024    jumping, running, walking, sneezing  Episodes of Leakage: 0x-hasn't tried jumping (was:2 times per day)-4/8/2024   Amount of leakage: small (was: med)  Pad use: yes  Pad Change frequency: 2x/day-as needed  Empty bladder just in case: only when going on a long walk      BOWEL HABITS  Types of symptoms: none   Frequency of bowel movements: 1x/day  Stool consistency: [place] Stool Scale: 4    SEXUAL HEALTH STATUS  No pain    Strength (MMT) 5/5 WAQAS LE   Transverse Abdominis: 2/5    Flexibility Summary: WNL WAQAS LE     Informed consent for internal pelvic evaluation given: Yes    External Observation:   Voluntary  contraction: present   Voluntary relaxation: present  Involuntary contraction: present  Involuntary relaxation: present    Mons pubis: WNL  Labia majora: WNL  Labia minora: WNL  Urethral meatus: WNL  Introitus: WNL  Perineal body: WNL    Sensory/Reflex:  Vestibule: normal bilaterally    Internal Examination   Scar: none    Pelvic Floor Muscle strength: (PERF= Power/Endurance/Reps/Fast) MMT: 3/3/4/3  External Anal Sphincter: nt  Accessory Muscle Use: abdominals    Tissue Laxity Test:  Anterior Wall: WNL  Posterior Wall: WNL  Apical: WNL    Eccentric lengthening contraction: wnl  Bearing down Valsalva maneuver (2-3x): wnl    Internal Palpation: WNL except slight increased tension R superficial and deep pelvic floor muscles       Assessment:   Improved coordination of pelvic floor muscles via simulated activities of daily living without leakage . No leakage in clinic today. Focused on transverse plane of motion, transitional movements and simulating downhill movements.   Pt instructed to perform intermittent walk/lite jogging at home for improved pelvic floor muscle/bladder coordination focusing on transitioning to downhill   Observed correct breathing for pressure management. Added: core strengthening exercise to provide increased pelvic floor support w adl's.  No adverse reaction reported.    Goals progressing as expected.         Goals:   Goals: (to be met in 10 visits)-progressing  1. Independent in HEP and progression with improved understanding of bladder/bowel health, bladder retraining and long-term management.    2. Patient will demonstrate improved bladder voiding habits to voiding every 2 hours with less urinary leakage.  3. Patient will demonstrate improve PFM isolation, coordination and strength so she is able to reduce leakage during ADL's.  4. Pt will have increased transverse abdominis muscle strength to 3/5 to assist with supporting pelvic floor muscles.   5. PFM contraction before increase  intra-abdominal pressure.    Plan: Continue plan of care as pt tolerates with focus on pelvic floor muscles . Next visit: pelvic brace w sim activities of daily living  -intermittent running, downhill transitions  Date: 3/26/2024  TX#: 2/10 Date:         4/3/2024        TX#: 3/ Date:           4/8/2024       TX#: 4/ Date:       4/16/2024           TX#: 5/ Date: 4/22/2024   Tx#: 6/ Date: 4/29/2024   Tx#: 7/ Date:   Tx#: 8/ Date:   Tx#: 9/ Date:   Tx#: 10/   PFM NM  Re-ed     Bladder diary  Bladder fitness+ handout    Breathing regulation    Graded exercise    Pressure management PFM NM  Re-ed     biofeedback -  external sensors were placed and leads were attached  Resting tone  Tonic  Phasic  [place]  With coordinated breathing    kegel +  *Sit  *March  *Stand  *sit<>stand    Biofeedback with constant interpretation of results.   PFM NM  Re-ed     Mindi/phys    Breathing management    pelvic brace     Lifting-  5#, 10# 15# w pelvic brace with coordinated breathing -deadlift x10 ea    Single leg jump forward w pelvic brace R/L x10    Ladder-with coordinated breathing   FW, lateral jumping    Quick feet w pelvic brace and with coordinated breathing      PFM NM  Re-ed     Mindi/phys    Breathing management    pelvic brace     Lifting-  15# w pelvic brace with coordinated breathing -deadlift x10 ea    Ladder-with coordinated breathing   FW, lateral jumping    [place] -modified  X10 w pelvic brace with coordinated breathing     Modified jumping jacks 2x10 w pelvic brace with coordinated breathing     Trampoline modified jumping jacks with coordinated breathing      PFM NM  Re-ed     Mindi/phys    Breathing management    pelvic brace     Lifting-  5#, 10#, 15# w pelvic brace with coordinated breathing -deadlift x10 ea    Ladder-with coordinated breathing   FW, lateral jumping, large stepping    [place] -modified  X10 w pelvic brace with coordinated breathing     March airex with coordinated breathing x1'    Alt lunge BOSU with  coordinated breathing x20    Posture ed       PFM NM  Re-ed     Breathing management    pelvic brace     Lifting-  5#, 10#, 15# w pelvic brace with coordinated breathing -deadlift x10 ea    Posture ed    pelvic brace + simulated activities of daily living                 Ther Ex:   diaphragmatic breathing     Abdominal bracing    Kegel    Kegel +  iso hip add  10\" x10     Kegel + articulating bridge x10     Kegel +  Resisted   ER RTB  x10     Kegel + clamshell RTB x10 R/L      Kegel +  U/LE lift  x10     Kegel with plie/ER resisted RTB x10     Issued HEP and RTB    1/2 jumping cassy    Sitting, standing kegel's       Ther Ex:   diaphragmatic breathing   abdominal bracing   kegel's   Ther Ex:   Kegel + articulating bridge x10     Standing-  Kegel + iso hip add + squat w ball behind back x20     Shuttle- DLP  31# with pelvic brace + iso hip adduction x30 reps     Elliptical L5 5 min Ther Ex:   Sit on ball-  Kegel tonic  Kegel +  U/LE lift  x10     Elliptical L5 3 min    Treadmill-  3.1-3.9 mph-  Intermittent varying speeds 7 min         Ther Ex:   Sit on ball-  Kegel tonic  Kegel +  U/LE lift  x10     4 pt-  U/LE lift 3 sec hold x10    FR-thoracic extension x20    Elliptical L5 3 min    Treadmill-  3.1-3.9 mph  Intermittent varying speeds 5 min Ther Ex:     Elliptical L5 3 min    Shuttle-  4 pt- U/LE 12# x15    Sit on ball-  Kegel +  U/LE lift  x10     4 pt U/LE lift w red spri x10 ea    FW plank x1 min    Side plank-modified    Precor-  Rotation/pivot R/L 10# w kegel    Balance board-  Kegel with FW x20    Lunge FW, SW BOSU -quick x20 ea                   HEP: Kegel 10 repetitions 3x/day, 10 sec on/10 sec off, 2 sec on 2-4 sec off , diaphragmatic breathing x10, + additions above     Charges: NMRx1 15', Tex2 30'       Total Timed Treatment: 45 min  Total Treatment Time: 45 min

## 2024-05-06 ENCOUNTER — APPOINTMENT (OUTPATIENT)
Dept: PHYSICAL THERAPY | Facility: HOSPITAL | Age: 54
End: 2024-05-06
Attending: PHYSICIAN ASSISTANT
Payer: COMMERCIAL

## 2024-05-13 ENCOUNTER — OFFICE VISIT (OUTPATIENT)
Dept: PHYSICAL THERAPY | Facility: HOSPITAL | Age: 54
End: 2024-05-13
Attending: PHYSICIAN ASSISTANT
Payer: COMMERCIAL

## 2024-05-13 PROCEDURE — 97112 NEUROMUSCULAR REEDUCATION: CPT

## 2024-05-13 PROCEDURE — 97110 THERAPEUTIC EXERCISES: CPT

## 2024-05-13 NOTE — PROGRESS NOTES
Diagnosis:   Stress incontinence (N39.3)      Referring Provider: Cesia Meraz  Date of Evaluation:    3/11/2024    Precautions:  None Next MD visit:   none scheduled  Date of Surgery: n/a   Insurance Primary/Secondary: [employer] [place] PPO / N/A     # Auth Visits: no auth            Subjective:  Able to walk 3, 2 mile, brisk, hilly walks over the weekend without leakage.   More aware of when initiates going downhill to incorporate a kegel.      PFDI-20: 52.09/300-Initial evaluation    Pain: 0/10      Objective: Tx flow sheet     5/13/2024   Forward plank 1 min  Side plank-modified 30 sec    4/22/2024   4 pt-increased flexion scapular region in 4 point  Sitting-rounded shoulders/increased thoracic flexion interscapular region    4/16/2024   Pt demonstrated correct diaphragmatic breathing at rest  4/3/2024  Biofeedback: Recruitment good, holding ability fair-tonic, good phasic, derecruitment good-tonic, fair phasic, baseline between contractions 0-5.0Ua, baseline resting average flatlineUa (normal 2.0Ua)    Observed: excessive abdominal contraction, hip adductor and glut contractions with kegel's    Initial evaluation with updates that are dated    URINARY HABITS  Types of symptoms:   stress incontinence-none this past week 5/13/2024   Urinary Frequency: 6/day  Leaking occurs: this past week only 1 occurrence of leakage with coughing repetitively-4/3/2024    jumping, running, walking, sneezing  Episodes of Leakage: 0x-hasn't tried jumping (was:2 times per day)-4/8/2024   Amount of leakage: small (was: med)  Pad use: yes  Pad Change frequency: 2x/day-as needed  Empty bladder just in case: only when going on a long walk      BOWEL HABITS  Types of symptoms: none   Frequency of bowel movements: 1x/day  Stool consistency: [place] Stool Scale: 4    SEXUAL HEALTH STATUS  No pain    Strength (MMT) 5/5 WAQAS LE   Transverse Abdominis: 2/5    Flexibility Summary: WNL WAQAS LE     Informed consent for internal pelvic evaluation given:  Yes    External Observation:   Voluntary contraction: present   Voluntary relaxation: present  Involuntary contraction: present  Involuntary relaxation: present    Mons pubis: WNL  Labia majora: WNL  Labia minora: WNL  Urethral meatus: WNL  Introitus: WNL  Perineal body: WNL    Sensory/Reflex:  Vestibule: normal bilaterally    Internal Examination   Scar: none    Pelvic Floor Muscle strength: (PERF= Power/Endurance/Reps/Fast) MMT: 3/3/4/3  External Anal Sphincter: nt  Accessory Muscle Use: abdominals    Tissue Laxity Test:  Anterior Wall: WNL  Posterior Wall: WNL  Apical: WNL    Eccentric lengthening contraction: wnl  Bearing down Valsalva maneuver (2-3x): wnl    Internal Palpation: WNL except slight increased tension R superficial and deep pelvic floor muscles       Assessment:   Improved coordination of pelvic floor muscles via simulated activities of daily living without leakage . No leakage in clinic today. Focused on transverse plane of motion, transitional movements and simulating downhill movements.   Pt instructed to perform intermittent walk/lite jogging at home for improved pelvic floor muscle/bladder coordination focusing on transitioning to downhill   Observed correct breathing for pressure management. Added: core strengthening exercise to provide increased pelvic floor support w adl's.  Improved core strength with addition of planks.  No adverse reaction reported.    Goals progressing as expected.         Goals:   Goals: (to be met in 10 visits)-progressing  1. Independent in HEP and progression with improved understanding of bladder/bowel health, bladder retraining and long-term management.    2. Patient will demonstrate improved bladder voiding habits to voiding every 2 hours with less urinary leakage.-MET  3. Patient will demonstrate improve PFM isolation, coordination and strength so she is able to reduce leakage during ADL's.  4. Pt will have increased transverse abdominis muscle strength to 3/5 to  assist with supporting pelvic floor muscles.   5. PFM contraction before increase intra-abdominal pressure.-MET    Plan: Continue plan of care as pt tolerates with focus on pelvic floor muscles . Next visit: pelvic brace w sim activities of daily living  -intermittent running, downhill transitions  Date: 3/26/2024  TX#: 2/10 Date:         4/3/2024        TX#: 3/ Date:           4/8/2024       TX#: 4/ Date:       4/16/2024           TX#: 5/ Date: 4/22/2024   Tx#: 6/ Date: 4/29/2024   Tx#: 7/ Date: 5/13/2024   Tx#: 8/ Date:   Tx#: 9/ Date:   Tx#: 10/   PFM NM  Re-ed     Bladder diary  Bladder fitness+ handout    Breathing regulation    Graded exercise    Pressure management PFM NM  Re-ed     biofeedback -  external sensors were placed and leads were attached  Resting tone  Tonic  Phasic  [place]  With coordinated breathing    kegel +  *Sit  *March  *Stand  *sit<>stand    Biofeedback with constant interpretation of results.   PFM NM  Re-ed     Mindi/phys    Breathing management    pelvic brace     Lifting-  5#, 10# 15# w pelvic brace with coordinated breathing -deadlift x10 ea    Single leg jump forward w pelvic brace R/L x10    Ladder-with coordinated breathing   FW, lateral jumping    Quick feet w pelvic brace and with coordinated breathing      PFM NM  Re-ed     Mindi/phys    Breathing management    pelvic brace     Lifting-  15# w pelvic brace with coordinated breathing -deadlift x10 ea    Ladder-with coordinated breathing   FW, lateral jumping    [place] -modified  X10 w pelvic brace with coordinated breathing     Modified jumping jacks 2x10 w pelvic brace with coordinated breathing     Trampoline modified jumping jacks with coordinated breathing      PFM NM  Re-ed     Mindi/phys    Breathing management    pelvic brace     Lifting-  5#, 10#, 15# w pelvic brace with coordinated breathing -deadlift x10 ea    Ladder-with coordinated breathing   FW, lateral jumping, large stepping    Burpees -modified  X10 w pelvic brace  with coordinated breathing     March airex with coordinated breathing x1'    Alt lunge BOSU with coordinated breathing x20    Posture ed       PFM NM  Re-ed     Breathing management    pelvic brace     Lifting-  5#, 10#, 15# w pelvic brace with coordinated breathing -deadlift x10 ea    Posture ed    pelvic brace + simulated activities of daily living            PFM NM  Re-ed     Breathing management    pelvic brace     Lifting-  2 x10# pelvic brace with coordinated breathing -deadlift x10 ea    Posture ed    pelvic brace + simulated activities of daily living      Ther Ex:   diaphragmatic breathing     Abdominal bracing    Kegel    Kegel +  iso hip add  10\" x10     Kegel + articulating bridge x10     Kegel +  Resisted   ER RTB  x10     Kegel + clamshell RTB x10 R/L      Kegel +  U/LE lift  x10     Kegel with plie/ER resisted RTB x10     Issued HEP and RTB    1/2 jumping cassy    Sitting, standing kegel's       Ther Ex:   diaphragmatic breathing   abdominal bracing   kegel's   Ther Ex:   Kegel + articulating bridge x10     Standing-  Kegel + iso hip add + squat w ball behind back x20     Shuttle- DLP  31# with pelvic brace + iso hip adduction x30 reps     Elliptical L5 5 min Ther Ex:   Sit on ball-  Kegel tonic  Kegel +  U/LE lift  x10     Elliptical L5 3 min    Treadmill-  3.1-3.9 mph-  Intermittent varying speeds 7 min         Ther Ex:   Sit on ball-  Kegel tonic  Kegel +  U/LE lift  x10     4 pt-  U/LE lift 3 sec hold x10    FR-thoracic extension x20    Elliptical L5 3 min    Treadmill-  3.1-3.9 mph  Intermittent varying speeds 5 min Ther Ex:     Elliptical L5 5 min    Shuttle-  4 pt- U/LE 12# x15    Sit on ball-  Kegel +  U/LE lift  x10     4 pt U/LE lift w red spri x10 ea    FW plank x1 min    Side plank-modified x30 sec      Precor-  Rotation/pivot R/L 10# w kegel    Balance board-  Kegel with FW x20    Lunge FW, SW BOSU -quick x20 ea              Ther Ex:     Elliptical L5 5 min    Shuttle-  4 pt- U/LE 12#  x15    Sit on ball-  Kegel +  U/LE lift  x10     4 pt U/LE lift w red spri x15 ea    FW plank x1 min    Side plank-modified x30 sec    Precor-  Rotation/pivot R/L 10# w kegel    Lunge FW, SW BOSU -quick x20 ea      HEP: Kegel 10 repetitions 3x/day, 10 sec on/10 sec off, 2 sec on 2-4 sec off , diaphragmatic breathing x10, + additions above     Charges: NMRx1 15', Tex2 30'       Total Timed Treatment: 45 min  Total Treatment Time: 45 min

## 2024-05-20 ENCOUNTER — APPOINTMENT (OUTPATIENT)
Dept: PHYSICAL THERAPY | Facility: HOSPITAL | Age: 54
End: 2024-05-20
Attending: PHYSICIAN ASSISTANT
Payer: COMMERCIAL

## 2024-05-20 DIAGNOSIS — E03.9 HYPOTHYROIDISM, UNSPECIFIED TYPE: ICD-10-CM

## 2024-05-20 RX ORDER — LEVOTHYROXINE SODIUM 112 MCG
112 TABLET ORAL
Qty: 30 TABLET | Refills: 0 | Status: SHIPPED | OUTPATIENT
Start: 2024-05-20

## 2024-05-20 NOTE — TELEPHONE ENCOUNTER
A refill request was received for:  Requested Prescriptions     Pending Prescriptions Disp Refills    SYNTHROID 112 MCG Oral Tab [Pharmacy Med Name: SYNTHROID 0.112MG (112MCG) TABLETS] 90 tablet 0     Sig: TAKE 1 TABLET(112 MCG) BY MOUTH BEFORE BREAKFAST       Last refill date: 02/26/24      Last office visit: 11/16/23      Future Appointments   Date Time Provider Department Center   6/5/2024 10:15 AM Abena Polanco, PT [facility]   6/6/2024  7:40 AM [facility] BREAST [facility] MAM[facility]   6/12/2024  4:30 PM Abena Polanco, PT [facility]

## 2024-05-29 ENCOUNTER — APPOINTMENT (OUTPATIENT)
Dept: PHYSICAL THERAPY | Facility: HOSPITAL | Age: 54
End: 2024-05-29
Attending: PHYSICIAN ASSISTANT
Payer: COMMERCIAL

## 2024-06-05 ENCOUNTER — APPOINTMENT (OUTPATIENT)
Dept: PHYSICAL THERAPY | Facility: HOSPITAL | Age: 54
End: 2024-06-05
Attending: PHYSICIAN ASSISTANT
Payer: COMMERCIAL

## 2024-06-06 ENCOUNTER — HOSPITAL ENCOUNTER (OUTPATIENT)
Dept: MAMMOGRAPHY | Facility: HOSPITAL | Age: 54
Discharge: HOME | End: 2024-06-06
Attending: PHYSICIAN ASSISTANT
Payer: COMMERCIAL

## 2024-06-06 DIAGNOSIS — R92.333 HETEROGENEOUSLY DENSE TISSUE OF BOTH BREASTS ON MAMMOGRAPHY: ICD-10-CM

## 2024-06-06 PROCEDURE — 76641 ULTRASOUND BREAST COMPLETE: CPT | Performed by: PHYSICIAN ASSISTANT

## 2024-06-12 ENCOUNTER — APPOINTMENT (OUTPATIENT)
Dept: PHYSICAL THERAPY | Facility: HOSPITAL | Age: 54
End: 2024-06-12
Attending: PHYSICIAN ASSISTANT
Payer: COMMERCIAL

## 2024-06-25 DIAGNOSIS — E03.9 HYPOTHYROIDISM, UNSPECIFIED TYPE: ICD-10-CM

## 2024-06-25 RX ORDER — LEVOTHYROXINE SODIUM 112 MCG
112 TABLET ORAL
Qty: 30 TABLET | Refills: 0 | OUTPATIENT
Start: 2024-06-25

## 2024-07-01 DIAGNOSIS — E03.9 HYPOTHYROIDISM, UNSPECIFIED TYPE: ICD-10-CM

## 2024-07-01 RX ORDER — LEVOTHYROXINE SODIUM 112 MCG
112 TABLET ORAL
Qty: 30 TABLET | Refills: 0 | Status: SHIPPED | OUTPATIENT
Start: 2024-07-01

## 2024-07-01 NOTE — TELEPHONE ENCOUNTER
A refill request was received for:  Requested Prescriptions     Pending Prescriptions Disp Refills    SYNTHROID 112 MCG Oral Tab 30 tablet 0     Sig: Take 1 tablet (112 mcg total) by mouth before breakfast.     Component  Ref Range & Units 11/8/23  7:58 AM   Free T4  0.8 - 1.7 ng/dL 1.1       Last refill date:5/20/2024       Last office visit:11/16/2023     Follow up due:  Future Appointments   Date Time Provider Department Center   7/2/2024  7:30 AM REF [facility]

## 2024-07-02 ENCOUNTER — LAB ENCOUNTER (OUTPATIENT)
Dept: LAB | Age: 54
End: 2024-07-02
Attending: PHYSICIAN ASSISTANT
Payer: COMMERCIAL

## 2024-07-02 DIAGNOSIS — E03.9 HYPOTHYROIDISM, UNSPECIFIED TYPE: ICD-10-CM

## 2024-07-02 LAB — TSI SER-ACNC: 1.11 MIU/ML (ref 0.36–3.74)

## 2024-07-02 PROCEDURE — 84443 ASSAY THYROID STIM HORMONE: CPT | Performed by: PHYSICIAN ASSISTANT

## 2024-07-31 DIAGNOSIS — E03.9 HYPOTHYROIDISM, UNSPECIFIED TYPE: ICD-10-CM

## 2024-07-31 RX ORDER — LEVOTHYROXINE SODIUM 112 MCG
112 TABLET ORAL
Qty: 90 TABLET | Refills: 0 | Status: SHIPPED | OUTPATIENT
Start: 2024-07-31

## 2024-09-17 ENCOUNTER — OFFICE VISIT (OUTPATIENT)
Dept: FAMILY MEDICINE CLINIC | Facility: CLINIC | Age: 54
End: 2024-09-17
Payer: COMMERCIAL

## 2024-09-17 ENCOUNTER — LAB ENCOUNTER (OUTPATIENT)
Dept: LAB | Age: 54
End: 2024-09-17
Attending: PHYSICIAN ASSISTANT
Payer: COMMERCIAL

## 2024-09-17 VITALS
WEIGHT: 168 LBS | OXYGEN SATURATION: 97 % | SYSTOLIC BLOOD PRESSURE: 118 MMHG | DIASTOLIC BLOOD PRESSURE: 76 MMHG | BODY MASS INDEX: 26.37 KG/M2 | HEART RATE: 79 BPM | HEIGHT: 67 IN

## 2024-09-17 DIAGNOSIS — Z00.00 ROUTINE GENERAL MEDICAL EXAMINATION AT A HEALTH CARE FACILITY: ICD-10-CM

## 2024-09-17 DIAGNOSIS — Z12.31 VISIT FOR SCREENING MAMMOGRAM: ICD-10-CM

## 2024-09-17 DIAGNOSIS — Z00.00 ROUTINE GENERAL MEDICAL EXAMINATION AT A HEALTH CARE FACILITY: Primary | ICD-10-CM

## 2024-09-17 DIAGNOSIS — E03.9 HYPOTHYROIDISM, UNSPECIFIED TYPE: ICD-10-CM

## 2024-09-17 LAB
ALBUMIN SERPL-MCNC: 4.7 G/DL (ref 3.2–4.8)
ALBUMIN/GLOB SERPL: 1.7 {RATIO} (ref 1–2)
ALP LIVER SERPL-CCNC: 75 U/L
ALT SERPL-CCNC: 29 U/L
ANION GAP SERPL CALC-SCNC: 7 MMOL/L (ref 0–18)
AST SERPL-CCNC: 24 U/L (ref ?–34)
BASOPHILS # BLD AUTO: 0.03 X10(3) UL (ref 0–0.2)
BASOPHILS NFR BLD AUTO: 0.7 %
BILIRUB SERPL-MCNC: 0.5 MG/DL (ref 0.3–1.2)
BUN BLD-MCNC: 13 MG/DL (ref 9–23)
CALCIUM BLD-MCNC: 10.3 MG/DL (ref 8.7–10.4)
CHLORIDE SERPL-SCNC: 106 MMOL/L (ref 98–112)
CHOLEST SERPL-MCNC: 208 MG/DL (ref ?–200)
CO2 SERPL-SCNC: 26 MMOL/L (ref 21–32)
CREAT BLD-MCNC: 0.73 MG/DL
EGFRCR SERPLBLD CKD-EPI 2021: 97 ML/MIN/1.73M2 (ref 60–?)
EOSINOPHIL # BLD AUTO: 0.11 X10(3) UL (ref 0–0.7)
EOSINOPHIL NFR BLD AUTO: 2.5 %
ERYTHROCYTE [DISTWIDTH] IN BLOOD BY AUTOMATED COUNT: 11.9 %
EST. AVERAGE GLUCOSE BLD GHB EST-MCNC: 97 MG/DL (ref 68–126)
FASTING PATIENT LIPID ANSWER: NO
FASTING STATUS PATIENT QL REPORTED: NO
GLOBULIN PLAS-MCNC: 2.8 G/DL (ref 2–3.5)
GLUCOSE BLD-MCNC: 87 MG/DL (ref 70–99)
HBA1C MFR BLD: 5 % (ref ?–5.7)
HCT VFR BLD AUTO: 41.2 %
HDLC SERPL-MCNC: 82 MG/DL (ref 40–59)
HGB BLD-MCNC: 14.5 G/DL
IMM GRANULOCYTES # BLD AUTO: 0.01 X10(3) UL (ref 0–1)
IMM GRANULOCYTES NFR BLD: 0.2 %
LDLC SERPL CALC-MCNC: 105 MG/DL (ref ?–100)
LYMPHOCYTES # BLD AUTO: 1.62 X10(3) UL (ref 1–4)
LYMPHOCYTES NFR BLD AUTO: 37.3 %
MCH RBC QN AUTO: 30.7 PG (ref 26–34)
MCHC RBC AUTO-ENTMCNC: 35.2 G/DL (ref 31–37)
MCV RBC AUTO: 87.3 FL
MONOCYTES # BLD AUTO: 0.39 X10(3) UL (ref 0.1–1)
MONOCYTES NFR BLD AUTO: 9 %
NEUTROPHILS # BLD AUTO: 2.18 X10 (3) UL (ref 1.5–7.7)
NEUTROPHILS # BLD AUTO: 2.18 X10(3) UL (ref 1.5–7.7)
NEUTROPHILS NFR BLD AUTO: 50.3 %
NONHDLC SERPL-MCNC: 126 MG/DL (ref ?–130)
OSMOLALITY SERPL CALC.SUM OF ELEC: 287 MOSM/KG (ref 275–295)
PLATELET # BLD AUTO: 227 10(3)UL (ref 150–450)
POTASSIUM SERPL-SCNC: 4 MMOL/L (ref 3.5–5.1)
PROT SERPL-MCNC: 7.5 G/DL (ref 5.7–8.2)
RBC # BLD AUTO: 4.72 X10(6)UL
SODIUM SERPL-SCNC: 139 MMOL/L (ref 136–145)
T4 FREE SERPL-MCNC: 1.9 NG/DL (ref 0.8–1.7)
TRIGL SERPL-MCNC: 121 MG/DL (ref 30–149)
TSI SER-ACNC: 0.33 MIU/ML (ref 0.55–4.78)
VIT B12 SERPL-MCNC: 953 PG/ML (ref 211–911)
VIT D+METAB SERPL-MCNC: 68.4 NG/ML (ref 30–100)
VLDLC SERPL CALC-MCNC: 20 MG/DL (ref 0–30)
WBC # BLD AUTO: 4.3 X10(3) UL (ref 4–11)

## 2024-09-17 PROCEDURE — 3074F SYST BP LT 130 MM HG: CPT | Performed by: PHYSICIAN ASSISTANT

## 2024-09-17 PROCEDURE — 99396 PREV VISIT EST AGE 40-64: CPT | Performed by: PHYSICIAN ASSISTANT

## 2024-09-17 PROCEDURE — 82607 VITAMIN B-12: CPT | Performed by: PHYSICIAN ASSISTANT

## 2024-09-17 PROCEDURE — 3008F BODY MASS INDEX DOCD: CPT | Performed by: PHYSICIAN ASSISTANT

## 2024-09-17 PROCEDURE — 83036 HEMOGLOBIN GLYCOSYLATED A1C: CPT | Performed by: PHYSICIAN ASSISTANT

## 2024-09-17 PROCEDURE — 80061 LIPID PANEL: CPT | Performed by: PHYSICIAN ASSISTANT

## 2024-09-17 PROCEDURE — 80050 GENERAL HEALTH PANEL: CPT | Performed by: PHYSICIAN ASSISTANT

## 2024-09-17 PROCEDURE — 84439 ASSAY OF FREE THYROXINE: CPT | Performed by: PHYSICIAN ASSISTANT

## 2024-09-17 PROCEDURE — 3078F DIAST BP <80 MM HG: CPT | Performed by: PHYSICIAN ASSISTANT

## 2024-09-17 PROCEDURE — 82306 VITAMIN D 25 HYDROXY: CPT | Performed by: PHYSICIAN ASSISTANT

## 2024-09-17 RX ORDER — LEVOTHYROXINE SODIUM 112 MCG
112 TABLET ORAL
Qty: 90 TABLET | Refills: 3 | Status: SHIPPED | OUTPATIENT
Start: 2024-09-17

## 2024-09-17 NOTE — PROGRESS NOTES
Subjective:   Patient ID: Anabelle Parr is a 54 year old female.    HPI  Patient presents today requesting physical exam.      Diet: well balanced  Exercise: regular exercise  Tobacco use: denies  Drug use: denies  Alcohol use: socially  LMP: 11 months without a cycle  Marital status: , 4 children  Occupation:      Health maintenance:  Pap/Hpv: 9/29/22 negative  Mammogram: 3/26/24 benign, dense breast  Performs SBEs: yes  Dexa scan: never  Colonoscopy: 11/15/23, recall 5 years  Discussed age appropriate vaccines    Feels very thirsty the last week    History/Other:   Review of Systems   Constitutional:  Negative for chills, fatigue and fever.   HENT:  Negative for congestion, ear pain, rhinorrhea and sore throat.    Eyes:  Negative for visual disturbance.   Respiratory:  Negative for cough, shortness of breath and wheezing.    Cardiovascular:  Negative for chest pain, palpitations and leg swelling.   Gastrointestinal:  Negative for abdominal pain, diarrhea, nausea and vomiting.   Genitourinary:  Negative for dysuria, frequency and hematuria.   Musculoskeletal:  Negative for arthralgias, gait problem and myalgias.   Skin:  Negative for rash.   Neurological:  Negative for weakness, light-headedness and headaches.   Hematological:  Negative for adenopathy.   Psychiatric/Behavioral:  Negative for dysphoric mood. The patient is not nervous/anxious.      Current Outpatient Medications   Medication Sig Dispense Refill    SYNTHROID 112 MCG Oral Tab Take 1 tablet (112 mcg total) by mouth before breakfast. 90 tablet 0    Cholecalciferol (VITAMIN D) 1000 units Oral Tab Take by mouth.      Vitamin B-12 1000 MCG Oral Tab Take 1 tablet (1,000 mcg total) by mouth daily.      Multiple Vitamin (ONE-DAILY MULTI VITAMINS) Oral Tab Take 1 tablet by mouth daily.       Allergies:  Allergies   Allergen Reactions    Pcn [Penicillins] RASH       Objective:   Physical Exam  Vitals and nursing note reviewed.    Constitutional:       General: She is not in acute distress.     Appearance: Normal appearance. She is well-developed.   HENT:      Head: Normocephalic and atraumatic.      Right Ear: Tympanic membrane, ear canal and external ear normal.      Left Ear: Tympanic membrane, ear canal and external ear normal.      Nose: Nose normal.      Mouth/Throat:      Mouth: Mucous membranes are moist.   Eyes:      Extraocular Movements: Extraocular movements intact.      Conjunctiva/sclera: Conjunctivae normal.      Pupils: Pupils are equal, round, and reactive to light.   Neck:      Thyroid: No thyromegaly.   Cardiovascular:      Rate and Rhythm: Normal rate and regular rhythm.      Pulses: Normal pulses.      Heart sounds: Normal heart sounds.   Pulmonary:      Effort: Pulmonary effort is normal.      Breath sounds: Normal breath sounds. No wheezing or rales.   Abdominal:      General: Bowel sounds are normal. There is no distension.      Palpations: Abdomen is soft. There is no mass.      Tenderness: There is no abdominal tenderness.   Musculoskeletal:         General: No tenderness. Normal range of motion.      Cervical back: Normal range of motion and neck supple.   Lymphadenopathy:      Cervical: No cervical adenopathy.   Skin:     General: Skin is warm and dry.      Findings: No rash.   Neurological:      General: No focal deficit present.      Mental Status: She is alert and oriented to person, place, and time.   Psychiatric:         Mood and Affect: Mood normal.         Behavior: Behavior normal.         Assessment & Plan:   1. Routine general medical examination at a health care facility  Patient is generally healthy.  Physical exam is unremarkable.  Check fasting labs.  Health maintenance issues discussed. Encouraged routine vaccines.  Advised healthy diet and regular exercise.  Annual physicals.  - CBC With Differential With Platelet; Future  - Comp Metabolic Panel (14); Future  - Lipid Panel; Future  - TSH W Reflex  To Free T4; Future  - Vitamin B12; Future  - Vitamin D; Future  - Hemoglobin A1C; Future    2. Visit for screening mammogram  - [facility] DANNY 2D+3D SCREENING BILAT (CPT=77067/59399); Future    3. Hypothyroidism, unspecified type  Recheck TSH and adjust dose of synthroid if needed.  - SYNTHROID 112 MCG Oral Tab; Take 1 tablet (112 mcg total) by mouth before breakfast.  Dispense: 90 tablet; Refill: 3

## 2025-02-03 ENCOUNTER — LAB ENCOUNTER (OUTPATIENT)
Dept: LAB | Age: 55
End: 2025-02-03
Attending: SPECIALIST
Payer: COMMERCIAL

## 2025-02-03 ENCOUNTER — EKG ENCOUNTER (OUTPATIENT)
Dept: LAB | Age: 55
End: 2025-02-03
Attending: FAMILY MEDICINE
Payer: COMMERCIAL

## 2025-02-03 ENCOUNTER — APPOINTMENT (OUTPATIENT)
Dept: LAB | Age: 55
End: 2025-02-03
Attending: FAMILY MEDICINE
Payer: COMMERCIAL

## 2025-02-03 DIAGNOSIS — Z01.818 PREOP EXAMINATION: ICD-10-CM

## 2025-02-03 DIAGNOSIS — Z01.818 PREOP EXAMINATION: Primary | ICD-10-CM

## 2025-02-03 LAB
ALBUMIN SERPL-MCNC: 4.6 G/DL (ref 3.2–4.8)
ALBUMIN/GLOB SERPL: 1.8 {RATIO} (ref 1–2)
ALP LIVER SERPL-CCNC: 78 U/L
ALT SERPL-CCNC: 21 U/L
ANION GAP SERPL CALC-SCNC: 10 MMOL/L (ref 0–18)
APTT PPP: 26.6 SECONDS (ref 23–36)
AST SERPL-CCNC: 20 U/L (ref ?–34)
ATRIAL RATE: 86 BPM
BASOPHILS # BLD AUTO: 0.02 X10(3) UL (ref 0–0.2)
BASOPHILS NFR BLD AUTO: 0.4 %
BILIRUB SERPL-MCNC: 0.6 MG/DL (ref 0.3–1.2)
BUN BLD-MCNC: 14 MG/DL (ref 9–23)
CALCIUM BLD-MCNC: 9.9 MG/DL (ref 8.7–10.6)
CHLORIDE SERPL-SCNC: 105 MMOL/L (ref 98–112)
CO2 SERPL-SCNC: 25 MMOL/L (ref 21–32)
CREAT BLD-MCNC: 0.86 MG/DL
EGFRCR SERPLBLD CKD-EPI 2021: 80 ML/MIN/1.73M2 (ref 60–?)
EOSINOPHIL # BLD AUTO: 0.14 X10(3) UL (ref 0–0.7)
EOSINOPHIL NFR BLD AUTO: 2.9 %
ERYTHROCYTE [DISTWIDTH] IN BLOOD BY AUTOMATED COUNT: 12.2 %
FASTING STATUS PATIENT QL REPORTED: NO
GLOBULIN PLAS-MCNC: 2.5 G/DL (ref 2–3.5)
GLUCOSE BLD-MCNC: 100 MG/DL (ref 70–99)
HCT VFR BLD AUTO: 42.1 %
HGB BLD-MCNC: 14.8 G/DL
IMM GRANULOCYTES # BLD AUTO: 0.01 X10(3) UL (ref 0–1)
IMM GRANULOCYTES NFR BLD: 0.2 %
LYMPHOCYTES # BLD AUTO: 1.18 X10(3) UL (ref 1–4)
LYMPHOCYTES NFR BLD AUTO: 24.2 %
MCH RBC QN AUTO: 31.2 PG (ref 26–34)
MCHC RBC AUTO-ENTMCNC: 35.2 G/DL (ref 31–37)
MCV RBC AUTO: 88.6 FL
MONOCYTES # BLD AUTO: 0.32 X10(3) UL (ref 0.1–1)
MONOCYTES NFR BLD AUTO: 6.6 %
NEUTROPHILS # BLD AUTO: 3.21 X10 (3) UL (ref 1.5–7.7)
NEUTROPHILS # BLD AUTO: 3.21 X10(3) UL (ref 1.5–7.7)
NEUTROPHILS NFR BLD AUTO: 65.7 %
OSMOLALITY SERPL CALC.SUM OF ELEC: 291 MOSM/KG (ref 275–295)
P AXIS: 57 DEGREES
P-R INTERVAL: 156 MS
PLATELET # BLD AUTO: 220 10(3)UL (ref 150–450)
POTASSIUM SERPL-SCNC: 4.1 MMOL/L (ref 3.5–5.1)
PROT SERPL-MCNC: 7.1 G/DL (ref 5.7–8.2)
Q-T INTERVAL: 372 MS
QRS DURATION: 78 MS
QTC CALCULATION (BEZET): 445 MS
R AXIS: 2 DEGREES
RBC # BLD AUTO: 4.75 X10(6)UL
SODIUM SERPL-SCNC: 140 MMOL/L (ref 136–145)
T AXIS: 48 DEGREES
VENTRICULAR RATE: 86 BPM
WBC # BLD AUTO: 4.9 X10(3) UL (ref 4–11)

## 2025-02-03 PROCEDURE — 85025 COMPLETE CBC W/AUTO DIFF WBC: CPT

## 2025-02-03 PROCEDURE — 36415 COLL VENOUS BLD VENIPUNCTURE: CPT

## 2025-02-03 PROCEDURE — 93010 ELECTROCARDIOGRAM REPORT: CPT | Performed by: INTERNAL MEDICINE

## 2025-02-03 PROCEDURE — 85730 THROMBOPLASTIN TIME PARTIAL: CPT

## 2025-02-03 PROCEDURE — 93005 ELECTROCARDIOGRAM TRACING: CPT

## 2025-02-03 PROCEDURE — 80053 COMPREHEN METABOLIC PANEL: CPT

## 2025-04-02 ENCOUNTER — HOSPITAL ENCOUNTER (OUTPATIENT)
Dept: MAMMOGRAPHY | Age: 55
Discharge: HOME | End: 2025-04-02
Attending: PHYSICIAN ASSISTANT
Payer: COMMERCIAL

## 2025-04-02 DIAGNOSIS — Z12.31 VISIT FOR SCREENING MAMMOGRAM: ICD-10-CM

## 2025-04-02 PROCEDURE — 77063 BREAST TOMOSYNTHESIS BI: CPT | Performed by: PHYSICIAN ASSISTANT

## 2025-04-02 PROCEDURE — 77067 SCR MAMMO BI INCL CAD: CPT | Performed by: PHYSICIAN ASSISTANT

## 2025-08-05 ENCOUNTER — OFFICE VISIT (OUTPATIENT)
Dept: FAMILY MEDICINE CLINIC | Facility: CLINIC | Age: 55
End: 2025-08-05

## 2025-08-05 VITALS
OXYGEN SATURATION: 98 % | SYSTOLIC BLOOD PRESSURE: 120 MMHG | DIASTOLIC BLOOD PRESSURE: 88 MMHG | RESPIRATION RATE: 16 BRPM | HEIGHT: 67 IN | BODY MASS INDEX: 27 KG/M2 | HEART RATE: 76 BPM | WEIGHT: 172 LBS

## 2025-08-05 DIAGNOSIS — L65.0 TELOGEN EFFLUVIUM: ICD-10-CM

## 2025-08-05 DIAGNOSIS — Z00.00 GENERAL MEDICAL EXAM: ICD-10-CM

## 2025-08-05 DIAGNOSIS — L65.9 HAIR LOSS: Primary | ICD-10-CM

## 2025-08-05 PROCEDURE — 3079F DIAST BP 80-89 MM HG: CPT | Performed by: FAMILY MEDICINE

## 2025-08-05 PROCEDURE — 3008F BODY MASS INDEX DOCD: CPT | Performed by: FAMILY MEDICINE

## 2025-08-05 PROCEDURE — 99214 OFFICE O/P EST MOD 30 MIN: CPT | Performed by: FAMILY MEDICINE

## 2025-08-05 PROCEDURE — 3074F SYST BP LT 130 MM HG: CPT | Performed by: FAMILY MEDICINE

## 2025-08-13 ENCOUNTER — LAB ENCOUNTER (OUTPATIENT)
Dept: LAB | Age: 55
End: 2025-08-13
Attending: FAMILY MEDICINE

## 2025-08-13 DIAGNOSIS — Z01.419 WELL WOMAN EXAM WITH ROUTINE GYNECOLOGICAL EXAM: ICD-10-CM

## 2025-08-13 DIAGNOSIS — E03.9 HYPOTHYROIDISM, UNSPECIFIED TYPE: ICD-10-CM

## 2025-08-13 DIAGNOSIS — L65.9 HAIR LOSS: ICD-10-CM

## 2025-08-13 DIAGNOSIS — Z00.00 ROUTINE GENERAL MEDICAL EXAMINATION AT A HEALTH CARE FACILITY: ICD-10-CM

## 2025-08-13 DIAGNOSIS — Z00.00 ANNUAL PHYSICAL EXAM: ICD-10-CM

## 2025-08-13 DIAGNOSIS — Z00.00 GENERAL MEDICAL EXAM: ICD-10-CM

## 2025-08-13 LAB
ALBUMIN SERPL-MCNC: 4.5 G/DL (ref 3.2–4.8)
ALBUMIN/GLOB SERPL: 1.7 (ref 1–2)
ALP LIVER SERPL-CCNC: 69 U/L (ref 46–118)
ALT SERPL-CCNC: 20 U/L (ref 10–49)
ANION GAP SERPL CALC-SCNC: 13 MMOL/L (ref 0–18)
AST SERPL-CCNC: 19 U/L (ref ?–34)
BASOPHILS # BLD AUTO: 0.02 X10(3) UL (ref 0–0.2)
BASOPHILS NFR BLD AUTO: 0.5 %
BILIRUB SERPL-MCNC: 0.7 MG/DL (ref 0.3–1.2)
BUN BLD-MCNC: 12 MG/DL (ref 9–23)
CALCIUM BLD-MCNC: 9.6 MG/DL (ref 8.7–10.6)
CHLORIDE SERPL-SCNC: 106 MMOL/L (ref 98–112)
CHOLEST SERPL-MCNC: 215 MG/DL (ref ?–200)
CO2 SERPL-SCNC: 24 MMOL/L (ref 21–32)
CREAT BLD-MCNC: 0.84 MG/DL (ref 0.55–1.02)
DEPRECATED HBV CORE AB SER IA-ACNC: 92 NG/ML (ref 50–306)
EGFRCR SERPLBLD CKD-EPI 2021: 82 ML/MIN/1.73M2 (ref 60–?)
EOSINOPHIL # BLD AUTO: 0.15 X10(3) UL (ref 0–0.7)
EOSINOPHIL NFR BLD AUTO: 3.9 %
ERYTHROCYTE [DISTWIDTH] IN BLOOD BY AUTOMATED COUNT: 12.4 %
EST. AVERAGE GLUCOSE BLD GHB EST-MCNC: 100 MG/DL (ref 68–126)
FASTING PATIENT LIPID ANSWER: YES
FASTING STATUS PATIENT QL REPORTED: YES
FOLATE SERPL-MCNC: 18 NG/ML (ref 5.4–?)
GLOBULIN PLAS-MCNC: 2.6 G/DL (ref 2–3.5)
GLUCOSE BLD-MCNC: 95 MG/DL (ref 70–99)
HBA1C MFR BLD: 5.1 % (ref ?–5.7)
HCT VFR BLD AUTO: 40.6 % (ref 35–48)
HDLC SERPL-MCNC: 86 MG/DL (ref 40–59)
HGB BLD-MCNC: 14 G/DL (ref 12–16)
IMM GRANULOCYTES # BLD AUTO: 0.01 X10(3) UL (ref 0–1)
IMM GRANULOCYTES NFR BLD: 0.3 %
LDLC SERPL CALC-MCNC: 110 MG/DL (ref ?–100)
LYMPHOCYTES # BLD AUTO: 1.52 X10(3) UL (ref 1–4)
LYMPHOCYTES NFR BLD AUTO: 39.5 %
MCH RBC QN AUTO: 30.8 PG (ref 26–34)
MCHC RBC AUTO-ENTMCNC: 34.5 G/DL (ref 31–37)
MCV RBC AUTO: 89.2 FL (ref 80–100)
MONOCYTES # BLD AUTO: 0.29 X10(3) UL (ref 0.1–1)
MONOCYTES NFR BLD AUTO: 7.5 %
NEUTROPHILS # BLD AUTO: 1.86 X10 (3) UL (ref 1.5–7.7)
NEUTROPHILS # BLD AUTO: 1.86 X10(3) UL (ref 1.5–7.7)
NEUTROPHILS NFR BLD AUTO: 48.3 %
NONHDLC SERPL-MCNC: 129 MG/DL (ref ?–130)
OSMOLALITY SERPL CALC.SUM OF ELEC: 296 MOSM/KG (ref 275–295)
PLATELET # BLD AUTO: 215 10(3)UL (ref 150–450)
POTASSIUM SERPL-SCNC: 4.2 MMOL/L (ref 3.5–5.1)
PROT SERPL-MCNC: 7.1 G/DL (ref 5.7–8.2)
RBC # BLD AUTO: 4.55 X10(6)UL (ref 3.8–5.3)
SODIUM SERPL-SCNC: 143 MMOL/L (ref 136–145)
T4 FREE SERPL-MCNC: 1.5 NG/DL (ref 0.8–1.7)
TRIGL SERPL-MCNC: 108 MG/DL (ref 30–149)
TSI SER-ACNC: 6 UIU/ML (ref 0.55–4.78)
VIT B12 SERPL-MCNC: 463 PG/ML (ref 211–911)
VIT D+METAB SERPL-MCNC: 45.8 NG/ML (ref 30–100)
VLDLC SERPL CALC-MCNC: 18 MG/DL (ref 0–30)
WBC # BLD AUTO: 3.9 X10(3) UL (ref 4–11)

## 2025-08-13 PROCEDURE — 82728 ASSAY OF FERRITIN: CPT | Performed by: FAMILY MEDICINE

## 2025-08-13 PROCEDURE — 83036 HEMOGLOBIN GLYCOSYLATED A1C: CPT | Performed by: FAMILY MEDICINE

## 2025-08-13 PROCEDURE — 82746 ASSAY OF FOLIC ACID SERUM: CPT | Performed by: FAMILY MEDICINE

## 2025-08-13 PROCEDURE — 84439 ASSAY OF FREE THYROXINE: CPT | Performed by: FAMILY MEDICINE

## 2025-08-13 PROCEDURE — 80050 GENERAL HEALTH PANEL: CPT | Performed by: FAMILY MEDICINE

## 2025-08-13 PROCEDURE — 86225 DNA ANTIBODY NATIVE: CPT | Performed by: FAMILY MEDICINE

## 2025-08-13 PROCEDURE — 80061 LIPID PANEL: CPT | Performed by: FAMILY MEDICINE

## 2025-08-13 PROCEDURE — 82607 VITAMIN B-12: CPT | Performed by: FAMILY MEDICINE

## 2025-08-13 PROCEDURE — 86038 ANTINUCLEAR ANTIBODIES: CPT | Performed by: FAMILY MEDICINE

## 2025-08-13 PROCEDURE — 82306 VITAMIN D 25 HYDROXY: CPT | Performed by: FAMILY MEDICINE

## 2025-08-14 LAB
DSDNA IGG SERPL IA-ACNC: 2.9 IU/ML (ref ?–10)
ENA AB SER QL IA: 0.2 UG/L (ref ?–0.7)
ENA AB SER QL IA: NEGATIVE

## 2025-08-19 ENCOUNTER — TELEPHONE (OUTPATIENT)
Dept: FAMILY MEDICINE CLINIC | Facility: CLINIC | Age: 55
End: 2025-08-19

## 2025-08-19 DIAGNOSIS — E03.9 HYPOTHYROIDISM, UNSPECIFIED TYPE: ICD-10-CM

## 2025-08-19 RX ORDER — LEVOTHYROXINE SODIUM 125 MCG
125 TABLET ORAL
Qty: 30 TABLET | Refills: 0 | Status: SHIPPED | OUTPATIENT
Start: 2025-08-19

## (undated) NOTE — LETTER
10/08/20        [address]  [address]      Dear Jake Griffin,    [facility] records indicate that you have outstanding lab work and or testing that was ordered for you and has not yet been completed:  Orders Placed This [place]

## (undated) NOTE — LETTER
12/11/18        [address]  [address]      Dear Chiara Garcia,    [facility] records indicate that you have outstanding lab work and or testing that was ordered for you and has not yet been completed:  Orders Placed This Encounter      Vitamin D, 25-Hydroxy [E]      Vitamin B12 [E]      Lipid Panel [E]    To provide you with the best possible care, please complete these orders at your earliest convenience. If you have recently completed these orders please disregard this letter. If you have any questions please call the office at Dept: [phone].      Thank you,       Kaley Osborn, DO